# Patient Record
Sex: FEMALE | Race: WHITE | NOT HISPANIC OR LATINO | Employment: OTHER | URBAN - METROPOLITAN AREA
[De-identification: names, ages, dates, MRNs, and addresses within clinical notes are randomized per-mention and may not be internally consistent; named-entity substitution may affect disease eponyms.]

---

## 2017-01-10 ENCOUNTER — TELEPHONE (OUTPATIENT)
Dept: OPHTHALMOLOGY | Facility: CLINIC | Age: 62
End: 2017-01-10

## 2017-01-22 PROBLEM — I24.9 ACS (ACUTE CORONARY SYNDROME): Status: ACTIVE | Noted: 2017-01-22

## 2017-01-24 PROBLEM — I24.9 ACS (ACUTE CORONARY SYNDROME): Status: RESOLVED | Noted: 2017-01-22 | Resolved: 2017-01-24

## 2017-01-24 PROBLEM — I24.9 ACS (ACUTE CORONARY SYNDROME): Status: ACTIVE | Noted: 2017-01-24

## 2017-01-31 ENCOUNTER — HOSPITAL ENCOUNTER (OUTPATIENT)
Dept: RADIOLOGY | Facility: HOSPITAL | Age: 62
Discharge: HOME | End: 2017-01-31
Attending: PODIATRIST
Payer: MEDICARE

## 2017-01-31 ENCOUNTER — OFFICE VISIT (OUTPATIENT)
Dept: PODIATRY | Facility: CLINIC | Age: 62
End: 2017-01-31
Payer: MEDICARE

## 2017-01-31 VITALS — HEIGHT: 65 IN | WEIGHT: 190.5 LBS | BODY MASS INDEX: 31.74 KG/M2

## 2017-01-31 DIAGNOSIS — M21.6X9 ACQUIRED EQUINUS DEFORMITY OF FOOT, UNSPECIFIED LATERALITY: ICD-10-CM

## 2017-01-31 DIAGNOSIS — M79.673 ARCH PAIN, UNSPECIFIED LATERALITY: Primary | ICD-10-CM

## 2017-01-31 DIAGNOSIS — M79.673 ARCH PAIN, UNSPECIFIED LATERALITY: ICD-10-CM

## 2017-01-31 PROCEDURE — 99999 PR PBB SHADOW E&M-EST. PATIENT-LVL II: CPT | Mod: PBBFAC,,, | Performed by: PODIATRIST

## 2017-01-31 PROCEDURE — 73630 X-RAY EXAM OF FOOT: CPT | Mod: 26,50,, | Performed by: RADIOLOGY

## 2017-01-31 PROCEDURE — 73630 X-RAY EXAM OF FOOT: CPT | Mod: 50,TC,PO

## 2017-01-31 PROCEDURE — 99213 OFFICE O/P EST LOW 20 MIN: CPT | Mod: 25,S$GLB,, | Performed by: PODIATRIST

## 2017-01-31 PROCEDURE — 29540 STRAPPING ANKLE &/FOOT: CPT | Mod: 50,S$GLB,, | Performed by: PODIATRIST

## 2017-01-31 PROCEDURE — 1159F MED LIST DOCD IN RCRD: CPT | Mod: S$GLB,,, | Performed by: PODIATRIST

## 2017-01-31 RX ORDER — VENLAFAXINE HYDROCHLORIDE 150 MG/1
150 CAPSULE, EXTENDED RELEASE ORAL
COMMUNITY
End: 2017-01-31

## 2017-01-31 RX ORDER — LAMOTRIGINE 200 MG/1
200 TABLET ORAL
COMMUNITY
End: 2017-01-31

## 2017-01-31 RX ORDER — ARIPIPRAZOLE 10 MG/1
10 TABLET ORAL
COMMUNITY
End: 2017-05-18

## 2017-01-31 RX ORDER — MELOXICAM 15 MG/1
15 TABLET ORAL DAILY
Qty: 30 TABLET | Refills: 0 | Status: SHIPPED | OUTPATIENT
Start: 2017-01-31 | End: 2018-09-26

## 2017-01-31 NOTE — PROGRESS NOTES
Subjective:      Patient ID: Maral Archibald is a 61 y.o. female.    Chief Complaint: Foot Pain (bilateral)    Deep aching pain both arches.  Gradual onset, worsening over past several weeks, aggravated by increased weight bearing, shoe gear, pressure.  No previous medical treatment.  OTC pain med not helping.  Denies truama, surgeyr both feet.      Review of Systems   Constitution: Negative for chills, diaphoresis, fever, malaise/fatigue and night sweats.   Cardiovascular: Negative for claudication, cyanosis, leg swelling and syncope.   Skin: Negative for color change, dry skin, rash, suspicious lesions and unusual hair distribution.   Musculoskeletal: Positive for myalgias. Negative for falls, joint pain, joint swelling, muscle cramps, muscle weakness and stiffness.   Gastrointestinal: Negative for constipation, diarrhea, nausea and vomiting.   Neurological: Negative for brief paralysis, disturbances in coordination, focal weakness, numbness, paresthesias, sensory change and tremors.           Objective:      Physical Exam   Constitutional: She appears well-developed and well-nourished. She is cooperative. No distress.   Cardiovascular:   Pulses:       Popliteal pulses are 2+ on the right side, and 2+ on the left side.        Dorsalis pedis pulses are 2+ on the right side, and 2+ on the left side.        Posterior tibial pulses are 2+ on the right side, and 2+ on the left side.   Capillary refill 3 seconds all toes/distal feet, all toes/both feet warm to touch.      Negative lymphadenopathy bilateral popliteal fossa and tarsal tunnel.      Negavie lower extremity edema bilateral.     Musculoskeletal:        Right ankle: Normal. She exhibits normal range of motion, no swelling, no ecchymosis, no deformity, no laceration and normal pulse. Achilles tendon normal. Achilles tendon exhibits no pain, no defect and normal Tamayo's test results.   Sharp deep pain to palpation inferior arches bilateral at medial arch  without ecchymosis, erythema, edema, or cardinal signs infection, and no signs of trauma.    Ankle dorsiflexion decreased at <10degrees bilateral with moderate increase with knee flexion bilateral.    Otherwise, Normal angle, base, station of gait. All ten toes without clubbing, cyanosis, or signs of ischemia.  No pain to palpation bilateral lower extremities.  Range of motion, stability, muscle strength, and muscle tone normal bilateral feet and legs.         Lymphadenopathy: No inguinal adenopathy noted on the right or left side.   Negative lymphadenopathy bilateral popliteal fossa and tarsal tunnel.   Neurological: She is alert. She has normal strength. She displays no atrophy and no tremor. No sensory deficit. She exhibits normal muscle tone. She displays no seizure activity. Gait normal.   Reflex Scores:       Patellar reflexes are 2+ on the right side and 2+ on the left side.       Achilles reflexes are 2+ on the right side and 2+ on the left side.  Negative tinel sign to percussion sural, superficial peroneal, deep peroneal, saphenous, and posterior tibial nerves right and left ankles and feet.     Skin: Skin is warm, dry and intact. No abrasion, no bruising, no burn, no ecchymosis, no laceration, no lesion and no rash noted. She is not diaphoretic. No cyanosis or erythema. No pallor. Nails show no clubbing.     Skin is normal age and health appropriate color, turgor, texture, and temperature bilateral lower extremities without ulceration, hyperpigmentation, discoloration, masses nodules or cords palpated.  No ecchymosis, erythema, edema, or cardinal signs of infection bilateral lower extremities.               Assessment:       Encounter Diagnoses   Name Primary?    Arch pain, unspecified laterality Yes    Acquired equinus deformity of foot, unspecified laterality          Plan:       Maral was seen today for foot pain.    Diagnoses and all orders for this visit:    Arch pain, unspecified laterality  -      X-Ray Foot Complete Bilateral; Future    Acquired equinus deformity of foot, unspecified laterality  -     X-Ray Foot Complete Bilateral; Future    Other orders  -     meloxicam (MOBIC) 15 MG tablet; Take 1 tablet (15 mg total) by mouth once daily.      I counseled the patient on her conditions, their implications and medical management.        Patient will stretch the tendo achilles complex three times daily as demonstrated in the office.  Literature was dispensed illustrating proper stretching technique.    I applied a plantar rest strapping to the patient's foot to offload symptomatic area, support the arch, and relieve pain.    Patient will obtain over the counter arch supports and wear them in shoes whenever possible.  Athletic shoes intended for walking or running are usually best.    The patient was advised that NSAID-type medications have two very important potential side effects: gastrointestinal irritation including hemorrhage and renal injuries. She was asked to take the medication with food and to stop if she experiences any GI upset. I asked her to call for vomiting, abdominal pain or black/bloody stools. The patient expresses understanding of these issues and questions were answered.    Discussed conservative treatment with shoes of adequate dimensions, material, and style to alleviate symptoms and delay or prevent surgical intervention.    Rx x-rays, meloxicam          Return in about 1 month (around 2/28/2017).

## 2017-01-31 NOTE — MR AVS SNAPSHOT
[facility]  [address] [facility] [address] [place] [address]  Phone: [phone]                  Maral Archibald   2017 3:15 PM   Office Visit    Description:  Female : 1955   Provider:  Mikal Mejia DPM   Department:  [facility]           Reason for Visit     Foot Pain           Diagnoses this Visit        Comments    Arch pain, unspecified laterality    -  Primary     Acquired equinus deformity of foot, unspecified laterality                To Do List           Future Appointments        Provider Department Dept Phone    2017 5:30 PM [facility] XR3 [facility] [phone]    3/7/2017 2:45 PM Mikal Mejia DPM [facility] [phone]      Goals (5 Years of Data)              17    Blood Pressure < 140/90   129/63  144/85  131/78      Follow-Up and Disposition     Return in about 1 month (around 2017).    Follow-up and Disposition History       These Medications        Disp Refills Start End    meloxicam (MOBIC) 15 MG tablet 30 tablet 0 2017     Take 1 tablet (15 mg total) by mouth once daily. - Oral    Pharmacy: [facility] - [address] Ph #: [phone]         [facility] On Call     [facility] On Call Nurse Care Line -  Assistance  Registered nurses in the [facility] provide clinical advisement, health education, appointment booking, and other advisory services.  Call for this free service at [phone].             Medications           Message regarding Medications     Verify the changes and/or additions to your medication regime listed below are the same as discussed with your clinician today.  If any of these changes or additions are incorrect, please notify your healthcare provider.        START taking these NEW medications        Refills    meloxicam (MOBIC) 15 MG tablet 0    Sig: Take 1 tablet (15 mg total) by mouth once daily.    Class:  Normal    Route: Oral           Verify that the below list of medications is an accurate representation of the medications you are currently taking.  If none reported, the list may be blank. If incorrect, please contact your healthcare provider. Carry this list with you in case of emergency.           Current Medications     aripiprazole (ABILIFY) 10 MG Tab Take 10 mg by mouth.    clonazePAM (KLONOPIN) 1 MG tablet TAKE 1 TABLET EVERY DAY AS NEEDED    felodipine (PLENDIL) 10 MG 24 hr tablet Take 1 tablet (10 mg total) by mouth once daily.    hydrochlorothiazide (MICROZIDE) 12.5 mg capsule TAKE 1 CAPSULE ONE TIME DAILY    hydrocodone-acetaminophen 10-325mg (NORCO)  mg Tab Take 1 tablet by mouth as needed.     hydrocodone-acetaminophen 7.5-325mg (NORCO) 7.5-325 mg per tablet Take 1 tablet by mouth every 6 (six) hours as needed.    lamotrigine (LAMICTAL) 200 MG tablet Take 1 tablet (200 mg total) by mouth once daily.    latanoprost 0.005 % ophthalmic solution INSTILL 1 DROP INTO BOTH EYES EVERY EVENING    melatonin 5 mg Cap Take 5 mg by mouth every evening.     multivitamin (ONE DAILY MULTIVITAMIN) per tablet Take 1 tablet by mouth once daily.    omeprazole (PRILOSEC) 20 MG capsule Take 1 capsule (20 mg total) by mouth once daily.    polyethylene glycol (GLYCOLAX) 17 gram/dose powder Take 17 g by mouth daily as needed.    promethazine (PHENERGAN) 25 MG tablet TAKE 1 TABLET (25 MG TOTAL) BY MOUTH EVERY 6 (SIX) HOURS AS NEEDED FOR NAUSEA.    RESTASIS 0.05 % ophthalmic emulsion INSTILL 1 DROP INTO BOTH EYES TWICE DAILY    sumatriptan (IMITREX) 100 MG tablet TAKE 1 TABLET EVERY DAY IF NEEDED FOR MIGRAINE    venlafaxine (EFFEXOR-XR) 150 MG Cp24 Take 1 capsule (150 mg total) by mouth every morning.    meloxicam (MOBIC) 15 MG tablet Take 1 tablet (15 mg total) by mouth once daily.           Clinical Reference Information           Vital Signs - Last Recorded  Most recent update: 1/31/2017  4:05 PM by Velma Priest,  "LPN    Ht Wt LMP BMI       5' 5" (1.651 m) 86.4 kg (190 lb 7.6 oz) 12/28/1986 31.7 kg/m2       Allergies as of 1/31/2017     Sulfa (Sulfonamide Antibiotics)      Immunizations Administered on Date of Encounter - 1/31/2017     None      Orders Placed During Today's Visit     Future Labs/Procedures Expected by Expires    X-Ray Foot Complete Bilateral  1/31/2017 2/1/2018      "

## 2017-02-08 ENCOUNTER — TELEPHONE (OUTPATIENT)
Dept: NEUROSURGERY | Facility: CLINIC | Age: 62
End: 2017-02-08

## 2017-02-08 DIAGNOSIS — R51.9 HEADACHE, UNSPECIFIED HEADACHE TYPE: Primary | ICD-10-CM

## 2017-02-08 DIAGNOSIS — I10 ESSENTIAL HYPERTENSION: ICD-10-CM

## 2017-02-08 NOTE — TELEPHONE ENCOUNTER
----- Message from Antonia Tuttle sent at 2/6/2017  5:32 PM CST -----  Contact: Self   Pt was seen by the physician two years ago at [facility] and was treated for fluid on her brain. Pt stated she is having the same symptoms and would like to be seen.     Pt can be contacted at [phone]

## 2017-02-08 NOTE — TELEPHONE ENCOUNTER
Called patient to inform her that referral to sleep medicine was sent per Jessica Dallas's orders. Instructed patient to call our office to schedule follow up upon completion of sleep study. Patient JAS

## 2017-02-10 ENCOUNTER — TELEPHONE (OUTPATIENT)
Dept: NEUROLOGY | Facility: CLINIC | Age: 62
End: 2017-02-10

## 2017-02-20 ENCOUNTER — TELEPHONE (OUTPATIENT)
Dept: NEUROSURGERY | Facility: CLINIC | Age: 62
End: 2017-02-20

## 2017-02-20 NOTE — TELEPHONE ENCOUNTER
Pt called regarding sleep study referral. Informed pt the referral has been entered. Gave pt the Sleep Study Providers name for her to call to discuss scheduling an appt. Informed Pt after sleep study, she is to call us for a follow up appt. Pt verbalized understanding.

## 2017-03-21 RX ORDER — LATANOPROST 50 UG/ML
SOLUTION/ DROPS OPHTHALMIC
Qty: 3 BOTTLE | Refills: 0 | Status: SHIPPED | OUTPATIENT
Start: 2017-03-21 | End: 2017-05-24 | Stop reason: SDUPTHER

## 2017-04-06 ENCOUNTER — TELEPHONE (OUTPATIENT)
Dept: NEUROLOGY | Facility: CLINIC | Age: 62
End: 2017-04-06

## 2017-04-06 NOTE — TELEPHONE ENCOUNTER
----- Message from Apurva Amor sent at 4/6/2017 12:47 PM CDT -----  Patient is experiencing bad headaches/requesting soon appointment/please call patient back at [phone] to schedule or advise.

## 2017-04-13 ENCOUNTER — OFFICE VISIT (OUTPATIENT)
Dept: NEUROLOGY | Facility: CLINIC | Age: 62
End: 2017-04-13
Payer: MEDICARE

## 2017-04-13 VITALS
HEART RATE: 90 BPM | DIASTOLIC BLOOD PRESSURE: 93 MMHG | SYSTOLIC BLOOD PRESSURE: 149 MMHG | HEIGHT: 64 IN | TEMPERATURE: 97 F | WEIGHT: 180 LBS | BODY MASS INDEX: 30.73 KG/M2 | RESPIRATION RATE: 16 BRPM

## 2017-04-13 DIAGNOSIS — F41.9 ANXIETY: ICD-10-CM

## 2017-04-13 DIAGNOSIS — G43.719 INTRACTABLE CHRONIC MIGRAINE WITHOUT AURA AND WITHOUT STATUS MIGRAINOSUS: ICD-10-CM

## 2017-04-13 DIAGNOSIS — G47.00 INSOMNIA, UNSPECIFIED TYPE: ICD-10-CM

## 2017-04-13 DIAGNOSIS — M50.90 CERVICAL NECK PAIN WITH EVIDENCE OF DISC DISEASE: Primary | ICD-10-CM

## 2017-04-13 DIAGNOSIS — F32.A DEPRESSION, UNSPECIFIED DEPRESSION TYPE: ICD-10-CM

## 2017-04-13 PROCEDURE — 96374 THER/PROPH/DIAG INJ IV PUSH: CPT | Mod: S$GLB,,, | Performed by: PSYCHIATRY & NEUROLOGY

## 2017-04-13 PROCEDURE — 1160F RVW MEDS BY RX/DR IN RCRD: CPT | Mod: S$GLB,,, | Performed by: PSYCHIATRY & NEUROLOGY

## 2017-04-13 PROCEDURE — 3080F DIAST BP >= 90 MM HG: CPT | Mod: S$GLB,,, | Performed by: PSYCHIATRY & NEUROLOGY

## 2017-04-13 PROCEDURE — 99214 OFFICE O/P EST MOD 30 MIN: CPT | Mod: 25,S$GLB,, | Performed by: PSYCHIATRY & NEUROLOGY

## 2017-04-13 PROCEDURE — 3077F SYST BP >= 140 MM HG: CPT | Mod: S$GLB,,, | Performed by: PSYCHIATRY & NEUROLOGY

## 2017-04-13 PROCEDURE — 96372 THER/PROPH/DIAG INJ SC/IM: CPT | Mod: 59,S$GLB,, | Performed by: PSYCHIATRY & NEUROLOGY

## 2017-04-13 PROCEDURE — 99999 PR PBB SHADOW E&M-EST. PATIENT-LVL III: CPT | Mod: PBBFAC,,, | Performed by: PSYCHIATRY & NEUROLOGY

## 2017-04-13 RX ORDER — LISINOPRIL 20 MG/1
20 TABLET ORAL DAILY
Qty: 90 TABLET | Refills: 3 | Status: SHIPPED | OUTPATIENT
Start: 2017-04-13 | End: 2017-05-17 | Stop reason: SDUPTHER

## 2017-04-13 RX ORDER — KETOROLAC TROMETHAMINE 30 MG/ML
30 INJECTION, SOLUTION INTRAMUSCULAR; INTRAVENOUS ONCE
Status: COMPLETED | OUTPATIENT
Start: 2017-04-13 | End: 2017-04-13

## 2017-04-13 RX ORDER — METHYLPREDNISOLONE 4 MG/1
TABLET ORAL
Qty: 1 PACKAGE | Refills: 0 | Status: SHIPPED | OUTPATIENT
Start: 2017-04-13 | End: 2017-05-04

## 2017-04-13 RX ORDER — ONDANSETRON 2 MG/ML
4 INJECTION INTRAMUSCULAR; INTRAVENOUS
Status: COMPLETED | OUTPATIENT
Start: 2017-04-13 | End: 2017-04-13

## 2017-04-13 RX ADMIN — ONDANSETRON 4 MG: 2 INJECTION INTRAMUSCULAR; INTRAVENOUS at 10:04

## 2017-04-13 RX ADMIN — KETOROLAC TROMETHAMINE 30 MG: 30 INJECTION, SOLUTION INTRAMUSCULAR; INTRAVENOUS at 10:04

## 2017-04-13 NOTE — PROGRESS NOTES
"Subjective:       Patient ID: Maral Archibald is a 60 y.o. female.    Chief Complaint: Headache    HPI  The patient is a 60 y/o woman with severe intractable headaches that "feel as they did when diagnosed with hydrocephalus" but is now proven to have stable ventriculomegaly. The patient has remote history of intracranial ruptured aneurysms. She had a shunt on the right ventricle that was switched to the left. Last April 2016 it was ligated as it was over draining.  She has been to the ED twice in the last 2 months. The headache is mainly on the vertex associated with nausea not responsive to phenergan. Recent imaging studies show stable ventriculomegaly. Dr. Patel has seen the patient and performed a peripheral ligation at the cervical level. In addition, the patient suffers with severe neck pain. She had recent RFA with no relief. She has been offered a Spinal Cord Stimulator. She is concerned regarding her 20% copay of a $40,000.00 bill.  Otherwise, the information below is accurate and current     Headache questionnaire   1. When did your Headaches start?   Over a year ago   2. Where are your headaches located?   Exactly on top of head with feelings of extreme pressure   3. Are your headaches preceded or accompanied by other symptoms? yes   If yes, please describe. Nausea, sore throat, tight neck   4. Please gege the word that best describes your headache's intensity:   severe   5. Your headache's characteristics:   Excruciating, Pressure   6. Using a scale of 1 through 10, with 0 = no pain and 10 = the worst pain:   What score is your headache now? 8   What score is your headache at its worst? 10   What score is your headache at its best? 5   7. How long does the headache last?   Every day all day   8. How often does the headache occur?   daily   9. Possible associated headache symptoms:   Sensitivity to light, Sensitivity to noise, Blurred vision,   Loss of appetite, Nausea, Dizziness, Vertigo,   Irritability, " Anger, Anxiety, Nasal or sinus pressure/ pain,   Problems with concentration,   Problems with memory, Problems with task completion,   Problems with relaxation, Neck tightness, Neck pain,   Dark floaters, problems swallowing, facial pain   10. Does the headache awaken you at night? yes   If so, how often? Not every night   11. Headache improving factors:   Darkness, Ice,   Medications: Norco, Toradol, BC powder with Coke or caffeine   12. Headache worsening factors:   Fatigue, Light, Noise, Bending over,   Changes in weather, Stress                  Review of Systems   Constitutional: Positive for diaphoresis (When working outside) and fatigue. Negative for fever, activity change and appetite change.   HENT: Positive for sinus pressure and sore throat (Dry mouth). Negative for congestion, dental problem, hearing loss, tinnitus, trouble swallowing and voice change.    Eyes: Positive for photophobia, pain (Headache behind the eyes) and visual disturbance (Floaters). Negative for redness.   Respiratory: Positive for cough and wheezing. Negative for chest tightness and shortness of breath.    Cardiovascular: Negative for chest pain, palpitations and leg swelling.   Gastrointestinal: Positive for nausea and constipation. Negative for vomiting, abdominal pain and blood in stool.   Endocrine: Negative for cold intolerance and heat intolerance.   Genitourinary: Negative for urgency, frequency, difficulty urinating and menstrual problem.   Musculoskeletal: Positive for joint swelling (Lately, mild at the ankles), neck pain and neck stiffness. Negative for myalgias, back pain, arthralgias and gait problem.   Skin: Negative.    Neurological: Positive for dizziness, light-headedness and headaches. Negative for tremors, seizures, syncope, facial asymmetry, speech difficulty, weakness and numbness.   Hematological: Negative for adenopathy. Does not bruise/bleed easily.   Psychiatric/Behavioral: Positive for confusion, sleep  disturbance, dysphoric mood (Depression and anxiety), decreased concentration and agitation. Negative for suicidal ideas, behavioral problems and self-injury. The patient is not nervous/anxious and is not hyperactive.          Past Medical History   Diagnosis Date    Hypertension     Hepatitis C     Allergy     Headache(784.0)     Seizures     Bipolar disorder      type I    Closed head injury     Stroke      aneurysm, cerebral     Past Surgical History   Procedure Laterality Date     section, classic      Tonsillectomy, adenoidectomy, bilateral myringotomy and tubes      Cholecystectomy      Cerebral aneurysm repair      Total abdominal hysterectomy      Tonsillectomy      Adenoidectomy      Lasik       OU x 10 yrs    Shunt removal Left      History     Social History    Marital Status:      Spouse Name: N/A     Number of Children: 0    Years of Education: N/A     Occupational History    Not on file.     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  Maral is able to drive.  She does not work outside of her home.      Allergies   Allergen Reactions    Sulfa (Sulfonamide Antibiotics) Nausea Only         Current Outpatient Prescriptions   Medication Sig    acetaminophen (TYLENOL) 500 mg Cap     clonazePAM (KLONOPIN) 1 MG tablet TAKE 1 TABLET (1 MG TOTAL) BY MOUTH DAILY AS NEEDED.    COTTON SWABS MISC     cycloSPORINE (RESTASIS) 0.05 % ophthalmic emulsion Place 0.05 mLs (1 drop total) into both eyes 2 (two) times daily.    esterified estrogens (MENEST) 0.625 MG tablet Take 1 tablet (0.625 mg total) by mouth once daily.    felodipine (PLENDIL) 10 MG 24 hr tablet Take 1 tablet (10 mg  total) by mouth once daily.    fexofenadine (ALLEGRA) 180 MG tablet Take 1 tablet (180 mg total) by mouth once daily.    fluticasone (FLONASE) 50 mcg/actuation nasal spray 1 SPRAY BY EACH NARE ROUTE ONCE DAILY.    fluticasone (FLONASE) 50 mcg/actuation nasal spray 1 SPRAY BY EACH NARE ROUTE ONCE DAILY.    hydrochlorothiazide (MICROZIDE) 12.5 mg capsule Take 1 capsule (12.5 mg total) by mouth once daily.    hydrocodone-acetaminophen 10-325mg (NORCO)  mg Tab Take 1 tablet by mouth 3 (three) times daily as needed. (Patient taking differently: Take 1 tablet by mouth 3 (three) times daily as needed. 1/2 tab with 2 500 mg tylenol)    lamotrigine (LAMICTAL) 200 MG tablet Take 1 tablet (200 mg total) by mouth once daily.    latanoprost 0.005 % ophthalmic solution INSTILL 1 DROP INTO BOTH EYES EVERY EVENING    melatonin 5 mg Cap every evening.     omeprazole (PRILOSEC) 20 MG capsule Take 1 capsule (20 mg total) by mouth once daily.    pediatric multivit-iron-min Chew     polyethylene glycol (GLYCOLAX) 17 gram/dose powder Take 17 g by mouth daily as needed.    promethazine (PHENERGAN) 25 MG tablet Take 1 tablet (25 mg total) by mouth every 6 (six) hours as needed for Nausea.    sumatriptan (IMITREX) 100 MG tablet Take 1 tablet (100 mg total) by mouth once.    venlafaxine (EFFEXOR-XR) 150 MG Cp24 Take 1 capsule (150 mg total) by mouth every morning.     No current facility-administered medications for this visit.     Objective:      Vitals:    04/13/17 0949   BP: (!) 149/93   Pulse: 90   Resp: 16   Temp: 97 °F (36.1 °C)     Body mass index is 30.9 kg/(m^2).    Physical Exam  Alert and oriented in distress  Constitutional:     HENT:    Head: Evidence of old well healed craniotomy over rigth frontoparietal regiontraumatic, oral and nasal mucosa intact  Eyes: Conjunctivae and EOM are normal. Pupils are equal, round, and reactive to light OU  Neck: Neck supple. No thyromegaly present  Cardiovascular: Normal  rate and normal heart sounds  No murmur heard  Pulmonary/Chest: Effort normal and breath sounds normal  Musculoskeletal:Decreased cervical range of motion. Significant cervical paraspinal muscle spasm.  Skin: Skin is warm and dry  Psychiatric: Dysphoric mood and flat affect     Neuro exam:    Mental status:  Awake, attentive, Alert, oriented to self, place, year and month  Language function is intact  Naming, repetition and spontaneous meaningful speech expression are intact  Speech fluency is good and speech is clear  Remote and recent memory are good  Patient able to count backwards by 7    No findings to suggest executive dysfuntion    Patient has adequate insight    Mood is dysphoric    Cranial Nerves:  Smell was not formally evaluated  Cranial Nerves II - XII: intact  Pursuits were smooth, normal saccades, no nystagmus OU  Funduscopic exam - disc were flat and pink, no exudates or hemorrhages OU  Motor - facial movement was symmetrical and normal     Palate moved well and was symmetrical with normal palatal and oral sensation  Tongue movements were full    Coordination:     Rapid alternating movements and rapid finger tapping - normal bilaterally  Finger to nose - normal and symmetric bilaterally   Heel to shin test - normal and symmetric bilaterally   Arm roll - smooth and symmetric   No intentional or positional tremor.     Motor:  Normal muscle bulk and symmetry. No fasciculations were noted    No pronator drift  Strength of shoulder abduction, elbow flexion, wrist extension, elbow extension, finger flexion and hand intrinsics were 5/5 bilaterally    Strength of hip flexion, knee extension, knee flexion, ankle dorsiflexion, ankle plantarflexion were 5/5 bilaterally     Reflexes:  Tendon reflexes were 2 + at biceps, triceps, brachioradialis, patellar, and Achilles bilaterally  On plantar stimulation toes were down going bilaterally  No clonus was noted     Sensory: Intact to light touch, pin prick in all  extremities. Vibration and proprioception intact in all extremities.     Gait: Romberg absent. Normal gait. Normal arm swing and turns. Normal postural reflexes.     Review of Data:  Brain MRI: Enlargement of the lateral ventricles despite the presence of a ventriculoperitoneal shunt catheter.  No interval change when compared with the prior CT. Remote areas of cerebral infarction as detailed above. Chronic microvascular ischemic changes within the periventricular white matter.    Assessment and Plan   Multiple aneurysms status post rupture and complicating hydrocephalus ligated in April 2015. MRI reveals ventricular dilation which is now proven to be stable.   Chronic Migraine, Intractable, will request Botox authorization  Toradol 30 mg IV today and Zofran 4 mg IV today  Hypertension noted. Will add Lisinopril 20 mg daily  Medrol dose pack for status migrainosus  Referral to Dr. Molina for second opinion regarding SCS for intractable neck pain  Patient offered to come to the clinic during the week for IV Zofran/Toradol instead of going to the ED  Insomnia  Depression  Anxiety    Michelle Heaton M.D  Medical Director, Headache and Facial Pain  [facility]

## 2017-04-25 ENCOUNTER — PATIENT MESSAGE (OUTPATIENT)
Dept: NEUROLOGY | Facility: CLINIC | Age: 62
End: 2017-04-25

## 2017-04-25 ENCOUNTER — TELEPHONE (OUTPATIENT)
Dept: NEUROLOGY | Facility: CLINIC | Age: 62
End: 2017-04-25

## 2017-04-25 NOTE — TELEPHONE ENCOUNTER
Returned patient call. No answer, no option to leave a voicemail. Send a message via patient portal.

## 2017-04-25 NOTE — TELEPHONE ENCOUNTER
----- Message from Delmy Conner sent at 4/25/2017 12:46 PM CDT -----  Contact: self  Patient would like to speak to nurse regarding medication  MethyIPEDNI 4 mg It made her very sick   He phone is broke but she can received at text on her line to advise.     Thanks

## 2017-05-16 ENCOUNTER — TELEPHONE (OUTPATIENT)
Dept: NEUROLOGY | Facility: CLINIC | Age: 62
End: 2017-05-16

## 2017-05-16 NOTE — TELEPHONE ENCOUNTER
----- Message from Venice Schwarz sent at 5/16/2017 10:09 AM CDT -----  Contact: Patient  Maral, patient [phone], Calling for same day appointment. Patient is in extreme pain. 20 twenty minutes from office. Please advise. Booked appointment 5/17/17 at 1 pm and placed on the wait list. Thanks.

## 2017-05-17 ENCOUNTER — OFFICE VISIT (OUTPATIENT)
Dept: NEUROLOGY | Facility: CLINIC | Age: 62
End: 2017-05-17
Payer: MEDICARE

## 2017-05-17 VITALS
HEART RATE: 81 BPM | TEMPERATURE: 98 F | HEIGHT: 64 IN | DIASTOLIC BLOOD PRESSURE: 95 MMHG | RESPIRATION RATE: 17 BRPM | SYSTOLIC BLOOD PRESSURE: 154 MMHG | BODY MASS INDEX: 30.48 KG/M2 | WEIGHT: 178.56 LBS

## 2017-05-17 DIAGNOSIS — G47.00 INSOMNIA, UNSPECIFIED TYPE: ICD-10-CM

## 2017-05-17 DIAGNOSIS — F41.9 ANXIETY: ICD-10-CM

## 2017-05-17 DIAGNOSIS — G91.9 HYDROCEPHALUS: ICD-10-CM

## 2017-05-17 DIAGNOSIS — G43.711 CHRONIC MIGRAINE WITHOUT AURA, WITH INTRACTABLE MIGRAINE, SO STATED, WITH STATUS MIGRAINOSUS: ICD-10-CM

## 2017-05-17 DIAGNOSIS — F32.A DEPRESSION, UNSPECIFIED DEPRESSION TYPE: ICD-10-CM

## 2017-05-17 DIAGNOSIS — I60.7 CEREBRAL ANEURYSM RUPTURE: Primary | ICD-10-CM

## 2017-05-17 PROCEDURE — 1160F RVW MEDS BY RX/DR IN RCRD: CPT | Mod: S$GLB,,, | Performed by: PSYCHIATRY & NEUROLOGY

## 2017-05-17 PROCEDURE — 64615 CHEMODENERV MUSC MIGRAINE: CPT | Mod: S$GLB,,, | Performed by: PSYCHIATRY & NEUROLOGY

## 2017-05-17 PROCEDURE — 96374 THER/PROPH/DIAG INJ IV PUSH: CPT | Mod: 59,S$GLB,, | Performed by: PSYCHIATRY & NEUROLOGY

## 2017-05-17 PROCEDURE — 3077F SYST BP >= 140 MM HG: CPT | Mod: S$GLB,,, | Performed by: PSYCHIATRY & NEUROLOGY

## 2017-05-17 PROCEDURE — 99999 PR PBB SHADOW E&M-EST. PATIENT-LVL III: CPT | Mod: PBBFAC,,, | Performed by: PSYCHIATRY & NEUROLOGY

## 2017-05-17 PROCEDURE — 3080F DIAST BP >= 90 MM HG: CPT | Mod: S$GLB,,, | Performed by: PSYCHIATRY & NEUROLOGY

## 2017-05-17 RX ORDER — LISINOPRIL 20 MG/1
40 TABLET ORAL DAILY
Qty: 180 TABLET | Refills: 3 | Status: SHIPPED | OUTPATIENT
Start: 2017-05-17 | End: 2018-05-04 | Stop reason: SDUPTHER

## 2017-05-17 NOTE — MR AVS SNAPSHOT
[facility]  [address] [place] [address]  Phone: [phone]  Fax: [phone]                  Maral Archibald   2017 1:00 PM   Office Visit    Description:  Female : 1955   Provider:  Shaniqua Heaton MD   Department:  [place] [facility]           Diagnoses this Visit        Comments    Cerebral aneurysm rupture    -  Primary     Chronic migraine without aura, with intractable migraine, so stated, with status migrainosus         Anxiety         Hydrocephalus         Insomnia, unspecified type         Depression, unspecified depression type                To Do List           Future Appointments        Provider Department Dept Phone    2017 9:45 AM Jono Molina MD [place] [facility] [phone]    2017 10:00 AM Shaniqua Heaton MD [facility] [phone]    2017 2:15 PM Shaniqua Heaton MD [facility] [phone]      Goals (5 Years of Data)              Today    4/13/17    3/20/17    Blood Pressure < 140/90   154/95  154/95  154/95       These Medications        Disp Refills Start End    lisinopril (PRINIVIL,ZESTRIL) 20 MG tablet 180 tablet 3 2017    Take 2 tablets (40 mg total) by mouth once daily. - Oral    Pharmacy: [facility] - [address] Ph #: [phone]         [facility] On Call     [facility] On Call Nurse Care Line -  Assistance  Unless otherwise directed by your provider, please contact [facility], our nurse care line that is available for  assistance.     Registered nurses in the [facility] provide: appointment scheduling, clinical advisement, health education, and other advisory services.  Call: [phone] (toll free)               Medications           Message regarding Medications     Verify the changes and/or additions to your medication regime listed below are the same as discussed with your  clinician today.  If any of these changes or additions are incorrect, please notify your healthcare provider.        These medications were administered today        Dose Freq    onabotulinumtoxina injection 200 Units 200 Units Once    Sig: Inject 200 Units into the muscle once.    Class: Normal    Route: Intramuscular      CHANGE how you are taking these medications     Start Taking Instead of    lisinopril (PRINIVIL,ZESTRIL) 20 MG tablet lisinopril (PRINIVIL,ZESTRIL) 20 MG tablet    Dosage:  Take 2 tablets (40 mg total) by mouth once daily. Dosage:  Take 1 tablet (20 mg total) by mouth once daily.    Reason for Change:  Reorder            Verify that the below list of medications is an accurate representation of the medications you are currently taking.  If none reported, the list may be blank. If incorrect, please contact your healthcare provider. Carry this list with you in case of emergency.           Current Medications     aripiprazole (ABILIFY) 10 MG Tab Take 10 mg by mouth.    clonazePAM (KLONOPIN) 1 MG tablet TAKE 1 TABLET EVERY DAY AS NEEDED    felodipine (PLENDIL) 10 MG 24 hr tablet Take 1 tablet (10 mg total) by mouth once daily.    hydrochlorothiazide (MICROZIDE) 12.5 mg capsule TAKE 1 CAPSULE ONE TIME DAILY    lamotrigine (LAMICTAL) 200 MG tablet Take 1 tablet (200 mg total) by mouth once daily.    latanoprost 0.005 % ophthalmic solution INSTILL 1 DROP INTO BOTH EYES EVERY EVENING    lisinopril (PRINIVIL,ZESTRIL) 20 MG tablet Take 2 tablets (40 mg total) by mouth once daily.    melatonin 5 mg Cap Take 5 mg by mouth every evening.     meloxicam (MOBIC) 15 MG tablet Take 1 tablet (15 mg total) by mouth once daily.    multivitamin (ONE DAILY MULTIVITAMIN) per tablet Take 1 tablet by mouth once daily.    polyethylene glycol (GLYCOLAX) 17 gram/dose powder Take 17 g by mouth daily as needed.    promethazine (PHENERGAN) 25 MG tablet TAKE 1 TABLET (25 MG TOTAL) BY MOUTH EVERY 6 (SIX) HOURS AS NEEDED FOR  "NAUSEA.    RESTASIS 0.05 % ophthalmic emulsion INSTILL 1 DROP INTO BOTH EYES TWICE DAILY    sumatriptan (IMITREX) 100 MG tablet TAKE 1 TABLET EVERY DAY IF NEEDED FOR MIGRAINE    venlafaxine (EFFEXOR-XR) 150 MG Cp24 Take 1 capsule (150 mg total) by mouth every morning.           Clinical Reference Information           Your Vitals Were     BP Pulse Temp Resp Height Weight    154/95 (BP Location: Left arm, Patient Position: Sitting, BP Method: Automatic) 81 98 °F (36.7 °C) (Oral) 17 5' 4" (1.626 m) 81 kg (178 lb 9.2 oz)    Last Period BMI             12/28/1986 30.65 kg/m2         Blood Pressure          Most Recent Value    BP  (!)  154/95      Allergies as of 5/17/2017     Sulfa (Sulfonamide Antibiotics)      Immunizations Administered on Date of Encounter - 5/17/2017     None      Orders Placed During Today's Visit     Future Labs/Procedures Expected by Expires    Creatinine, serum  5/17/2017 7/16/2018    MRI Brain W WO Contrast  5/17/2017 5/17/2018      Administrations This Visit     onabotulinumtoxina injection 200 Units     Admin Date Action Dose Route Administered By             05/17/2017 Given 200 Units Intramuscular Shaniqua Heaton MD                      Language Assistance Services     ATTENTION: Language assistance services are available, free of charge. Please call [phone].      ATENCIÓN: Si gwen kathrine, tiene a nolen disposición servicios gratuitos de asistencia lingüística. Llame al [phone].     CHÚ Ý: N?u b?n nói Ti?ng Vi?t, có các d?ch v? h? tr? ngôn ng? mi?n phí dành cho b?n. G?i s? [phone].         [facility] complies with applicable Federal civil rights laws and does not discriminate on the basis of race, color, national origin, age, disability, or sex.        "

## 2017-05-17 NOTE — PROGRESS NOTES
"Subjective:       Patient ID: Maral Archibald is a 60 y.o. female.    Chief Complaint: Headache  INTERVAL HISTORY  The patient is added to the clinic in extreme pain. She states her head is about to explode. Last visit I added lisinopril 20 mg, her blood pressure remains elevated. She reports significant nausea, constant, she is using phenergan suppositories. I requested Botox but somehow the process was not initiated. We have exhausted all other interventions. I will inject her with Toradol 30 mg and Zofran 4 mg IV to break this headache.  She inquires about Marinol, I explained that at this time, we have to try Botox and reserve Marinol as a last resort  HPI  The patient is a 62 y/o woman with severe intractable headaches that "feel as they did when diagnosed with hydrocephalus" but is now proven to have stable ventriculomegaly. The patient has remote history of intracranial ruptured aneurysms. She had a shunt on the right ventricle that was switched to the left. Last April 2016 it was ligated as it was over draining.  She has been to the ED twice in the last 2 months. The headache is mainly on the vertex associated with nausea not responsive to phenergan. Recent imaging studies show stable ventriculomegaly. Dr. Patel has seen the patient and performed a peripheral ligation at the cervical level. In addition, the patient suffers with severe neck pain. She had recent RFA with no relief. She has been offered a Spinal Cord Stimulator. She is concerned regarding her 20% copay of a $40,000.00 bill.  Otherwise, the information below is accurate and current     Headache questionnaire   1. When did your Headaches start?   Over a year ago   2. Where are your headaches located?   Exactly on top of head with feelings of extreme pressure   3. Are your headaches preceded or accompanied by other symptoms? yes   If yes, please describe. Nausea, sore throat, tight neck   4. Please gege the word that best describes your headache's " intensity:   severe   5. Your headache's characteristics:   Excruciating, Pressure   6. Using a scale of 1 through 10, with 0 = no pain and 10 = the worst pain:   What score is your headache now? 8   What score is your headache at its worst? 10   What score is your headache at its best? 5   7. How long does the headache last?   Every day all day   8. How often does the headache occur?   daily   9. Possible associated headache symptoms:   Sensitivity to light, Sensitivity to noise, Blurred vision,   Loss of appetite, Nausea, Dizziness, Vertigo,   Irritability, Anger, Anxiety, Nasal or sinus pressure/ pain,   Problems with concentration,   Problems with memory, Problems with task completion,   Problems with relaxation, Neck tightness, Neck pain,   Dark floaters, problems swallowing, facial pain   10. Does the headache awaken you at night? yes   If so, how often? Not every night   11. Headache improving factors:   Darkness, Ice,   Medications: Norco, Toradol, BC powder with Coke or caffeine   12. Headache worsening factors:   Fatigue, Light, Noise, Bending over,   Changes in weather, Stress                  Review of Systems   Constitutional: Positive for diaphoresis (When working outside) and fatigue. Negative for fever, activity change and appetite change.   HENT: Positive for sinus pressure and sore throat (Dry mouth). Negative for congestion, dental problem, hearing loss, tinnitus, trouble swallowing and voice change.    Eyes: Positive for photophobia, pain (Headache behind the eyes) and visual disturbance (Floaters). Negative for redness.   Respiratory: Positive for cough and wheezing. Negative for chest tightness and shortness of breath.    Cardiovascular: Negative for chest pain, palpitations and leg swelling.   Gastrointestinal: Positive for nausea and constipation. Negative for vomiting, abdominal pain and blood in stool.   Endocrine: Negative for cold intolerance and heat intolerance.   Genitourinary:  Negative for urgency, frequency, difficulty urinating and menstrual problem.   Musculoskeletal: Positive for joint swelling (Lately, mild at the ankles), neck pain and neck stiffness. Negative for myalgias, back pain, arthralgias and gait problem.   Skin: Negative.    Neurological: Positive for dizziness, light-headedness and headaches. Negative for tremors, seizures, syncope, facial asymmetry, speech difficulty, weakness and numbness.   Hematological: Negative for adenopathy. Does not bruise/bleed easily.   Psychiatric/Behavioral: Positive for confusion, sleep disturbance, dysphoric mood (Depression and anxiety), decreased concentration and agitation. Negative for suicidal ideas, behavioral problems and self-injury. The patient is not nervous/anxious and is not hyperactive.          Past Medical History   Diagnosis Date    Hypertension     Hepatitis C     Allergy     Headache(784.0)     Seizures     Bipolar disorder      type I    Closed head injury     Stroke      aneurysm, cerebral     Past Surgical History   Procedure Laterality Date     section, classic      Tonsillectomy, adenoidectomy, bilateral myringotomy and tubes      Cholecystectomy      Cerebral aneurysm repair      Total abdominal hysterectomy      Tonsillectomy      Adenoidectomy      Lasik       OU x 10 yrs    Shunt removal Left      History     Social History    Marital Status:      Spouse Name: N/A     Number of Children: 0    Years of Education: N/A     Occupational History    Not on file.     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  Maral is able to drive.  She does not work outside of her home.      Allergies    Allergen Reactions    Sulfa (Sulfonamide Antibiotics) Nausea Only         Current Outpatient Prescriptions   Medication Sig    aripiprazole (ABILIFY) 10 MG Tab Take 10 mg by mouth.    clonazePAM (KLONOPIN) 1 MG tablet TAKE 1 TABLET EVERY DAY AS NEEDED    felodipine (PLENDIL) 10 MG 24 hr tablet Take 1 tablet (10 mg total) by mouth once daily.    hydrochlorothiazide (MICROZIDE) 12.5 mg capsule TAKE 1 CAPSULE ONE TIME DAILY    lamotrigine (LAMICTAL) 200 MG tablet Take 1 tablet (200 mg total) by mouth once daily.    latanoprost 0.005 % ophthalmic solution INSTILL 1 DROP INTO BOTH EYES EVERY EVENING    lisinopril (PRINIVIL,ZESTRIL) 20 MG tablet Take 1 tablet (20 mg total) by mouth once daily.    melatonin 5 mg Cap Take 5 mg by mouth every evening.     meloxicam (MOBIC) 15 MG tablet Take 1 tablet (15 mg total) by mouth once daily.    multivitamin (ONE DAILY MULTIVITAMIN) per tablet Take 1 tablet by mouth once daily.    polyethylene glycol (GLYCOLAX) 17 gram/dose powder Take 17 g by mouth daily as needed.    promethazine (PHENERGAN) 25 MG tablet TAKE 1 TABLET (25 MG TOTAL) BY MOUTH EVERY 6 (SIX) HOURS AS NEEDED FOR NAUSEA.    RESTASIS 0.05 % ophthalmic emulsion INSTILL 1 DROP INTO BOTH EYES TWICE DAILY    sumatriptan (IMITREX) 100 MG tablet TAKE 1 TABLET EVERY DAY IF NEEDED FOR MIGRAINE    venlafaxine (EFFEXOR-XR) 150 MG Cp24 Take 1 capsule (150 mg total) by mouth every morning.     No current facility-administered medications for this visit.        No current facility-administered medications for this visit.     Objective:      Vitals:    05/17/17 1305   BP: (!) 154/95   Pulse: 81   Resp: 17   Temp: 98 °F (36.7 °C)     Body mass index is 30.65 kg/(m^2).  .    Physical Exam  Alert and oriented in distress  Constitutional:     HENT:    Head: Evidence of old well healed craniotomy over rigth frontoparietal regiontraumatic, oral and nasal mucosa intact  Eyes: Conjunctivae and EOM are normal. Pupils are  equal, round, and reactive to light OU  Neck: Neck supple. No thyromegaly present  Cardiovascular: Normal rate and normal heart sounds  No murmur heard  Pulmonary/Chest: Effort normal and breath sounds normal  Musculoskeletal:Decreased cervical range of motion. Significant cervical paraspinal muscle spasm.  Skin: Skin is warm and dry  Psychiatric: Dysphoric mood and flat affect     Neuro exam:    Mental status:  Awake, attentive, Alert, oriented to self, place, year and month  Language function is intact  Naming, repetition and spontaneous meaningful speech expression are intact  Speech fluency is good and speech is clear  Remote and recent memory are good  Patient able to count backwards by 7    No findings to suggest executive dysfuntion    Patient has adequate insight    Mood is dysphoric    Cranial Nerves:  Smell was not formally evaluated  Cranial Nerves II - XII: intact  Pursuits were smooth, normal saccades, no nystagmus OU  Funduscopic exam - disc were flat and pink, no exudates or hemorrhages OU  Motor - facial movement was symmetrical and normal     Palate moved well and was symmetrical with normal palatal and oral sensation  Tongue movements were full    Coordination:     Rapid alternating movements and rapid finger tapping - normal bilaterally  Finger to nose - normal and symmetric bilaterally   Heel to shin test - normal and symmetric bilaterally   Arm roll - smooth and symmetric   No intentional or positional tremor.     Motor:  Normal muscle bulk and symmetry. No fasciculations were noted    No pronator drift  Strength of shoulder abduction, elbow flexion, wrist extension, elbow extension, finger flexion and hand intrinsics were 5/5 bilaterally    Strength of hip flexion, knee extension, knee flexion, ankle dorsiflexion, ankle plantarflexion were 5/5 bilaterally     Reflexes:  Tendon reflexes were 2 + at biceps, triceps, brachioradialis, patellar, and Achilles bilaterally  On plantar stimulation toes  were down going bilaterally  No clonus was noted     Sensory: Intact to light touch, pin prick in all extremities. Vibration and proprioception intact in all extremities.     Gait: Romberg absent. Normal gait. Normal arm swing and turns. Normal postural reflexes.     Review of Data:  Brain MRI: Enlargement of the lateral ventricles despite the presence of a ventriculoperitoneal shunt catheter.  No interval change when compared with the prior CT. Remote areas of cerebral infarction as detailed above. Chronic microvascular ischemic changes within the periventricular white matter.    BOTOX PROCEDURE    PROCEDURE PERFORMED: Botulinum toxin injection (01038)    CLINICAL INDICATION: G43.719 NDC: [id number]    A time out was conducted just before the start of the procedure to verify the correct patient and procedure, procedure location, and all relevant critical information.       This is the first Botox injections and I am aiming for at least 50%  improvement in the patient's symptoms. Frequency of treatment is every 3 months unless no response to the treatments, at which time we will discontinue the injections.     DESCRIPTION OF PROCEDURE: After obtaining informed consent and under   aseptic technique, a total of 155 units of botulinum toxin type A were   injected in the following muscles: Procerus 5 units,  5 units   bilaterally, frontalis 20 units, temporalis 20 units bilaterally,   occipitalis 15 units, upper cervical paraspinals 10 units bilaterally and trapezius 15 units bilaterally. The patient was given a total of 155 units in 31 sites.The patient tolerated the procedure well. There were no complications. The patient was given a prescription for repeat treatment in 3 months.     Unavoidable waste 45 units    Michelle Heaton M.D  Medical Director, Headache and Facial Pain  [facility]        Assessment and Plan   Multiple aneurysms status post rupture and complicating hydrocephalus ligated  in April 2015. MRI reveals ventricular dilation which is now proven to be stable as of 2/2016. Will repeat given the intensity of the headaches.  Botox authorization obtained. First treatment today  Toradol 30 mg IV today and Zofran 4 mg IV today  Hypertension noted. Will increase Lisinopril to 40 mg daily    Insomnia  Depression  Anxiety  Intractable nausea  RTC in 3 months    Michelle Heaton M.D  Medical Director, Headache and Facial Pain  [facility]

## 2017-05-18 ENCOUNTER — TELEPHONE (OUTPATIENT)
Dept: NEUROLOGY | Facility: CLINIC | Age: 62
End: 2017-05-18

## 2017-05-18 RX ORDER — MIRTAZAPINE 45 MG/1
45 TABLET, FILM COATED ORAL NIGHTLY
COMMUNITY
End: 2017-05-24 | Stop reason: ALTCHOICE

## 2017-05-18 NOTE — TELEPHONE ENCOUNTER
----- Message from Ana Hair sent at 5/18/2017 10:14 AM CDT -----  Contact: self   Patient wants to inform your office she no longer take ABILIFY, patient now taking mirtazipine 45mg, [email or web address], any questions please call back at [phone]

## 2017-05-24 ENCOUNTER — OFFICE VISIT (OUTPATIENT)
Dept: NEUROLOGY | Facility: CLINIC | Age: 62
End: 2017-05-24
Payer: MEDICARE

## 2017-05-24 VITALS
HEART RATE: 110 BPM | DIASTOLIC BLOOD PRESSURE: 98 MMHG | BODY MASS INDEX: 30.48 KG/M2 | HEIGHT: 64 IN | WEIGHT: 178.56 LBS | SYSTOLIC BLOOD PRESSURE: 136 MMHG | RESPIRATION RATE: 20 BRPM

## 2017-05-24 DIAGNOSIS — G43.711 CHRONIC MIGRAINE WITHOUT AURA, WITH INTRACTABLE MIGRAINE, SO STATED, WITH STATUS MIGRAINOSUS: ICD-10-CM

## 2017-05-24 PROCEDURE — 96374 THER/PROPH/DIAG INJ IV PUSH: CPT | Mod: S$GLB,,, | Performed by: PSYCHIATRY & NEUROLOGY

## 2017-05-24 PROCEDURE — 1160F RVW MEDS BY RX/DR IN RCRD: CPT | Mod: S$GLB,,, | Performed by: PSYCHIATRY & NEUROLOGY

## 2017-05-24 PROCEDURE — 99999 PR PBB SHADOW E&M-EST. PATIENT-LVL III: CPT | Mod: PBBFAC,,, | Performed by: PSYCHIATRY & NEUROLOGY

## 2017-05-24 PROCEDURE — 96372 THER/PROPH/DIAG INJ SC/IM: CPT | Mod: 59,S$GLB,, | Performed by: PSYCHIATRY & NEUROLOGY

## 2017-05-24 PROCEDURE — 3075F SYST BP GE 130 - 139MM HG: CPT | Mod: S$GLB,,, | Performed by: PSYCHIATRY & NEUROLOGY

## 2017-05-24 PROCEDURE — 3080F DIAST BP >= 90 MM HG: CPT | Mod: S$GLB,,, | Performed by: PSYCHIATRY & NEUROLOGY

## 2017-05-24 RX ORDER — HYDROCODONE BITARTRATE AND ACETAMINOPHEN 7.5; 325 MG/1; MG/1
1 TABLET ORAL EVERY 6 HOURS PRN
Qty: 20 TABLET | Refills: 0 | Status: SHIPPED | OUTPATIENT
Start: 2017-05-24 | End: 2017-06-28 | Stop reason: SDUPTHER

## 2017-05-24 RX ORDER — ONDANSETRON 2 MG/ML
4 INJECTION INTRAMUSCULAR; INTRAVENOUS ONCE
Status: COMPLETED | OUTPATIENT
Start: 2017-05-24 | End: 2017-05-24

## 2017-05-24 RX ORDER — LATANOPROST 50 UG/ML
SOLUTION/ DROPS OPHTHALMIC
Qty: 1 BOTTLE | Refills: 1 | Status: SHIPPED | OUTPATIENT
Start: 2017-05-24

## 2017-05-24 RX ORDER — KETOROLAC TROMETHAMINE 30 MG/ML
30 INJECTION, SOLUTION INTRAMUSCULAR; INTRAVENOUS
Status: COMPLETED | OUTPATIENT
Start: 2017-05-24 | End: 2017-05-24

## 2017-05-24 RX ADMIN — ONDANSETRON 4 MG: 2 INJECTION INTRAMUSCULAR; INTRAVENOUS at 04:05

## 2017-05-24 RX ADMIN — KETOROLAC TROMETHAMINE 30 MG: 30 INJECTION, SOLUTION INTRAMUSCULAR; INTRAVENOUS at 04:05

## 2017-05-24 NOTE — PROGRESS NOTES
"Subjective:       Patient ID: Maral Archibald is a 60 y.o. female.    Chief Complaint: Headache  INTERVAL HISTORY  The patient is reporting persistent headache. Last visit I injected her with the first round of Botox, 1 week ago. She states her head is about to explode. Last visit I increased lisinopril to 40 mg, her blood pressure remains elevated but it is better today. She reports significant nausea, constant, she is using phenergan suppositories. She inquired about Marinol last week, I explained that at this time, we have to try Botox and reserve Marinol as a last resort. Today she inquires about Suboxone, I explained that that is a treatment for drug addiction, a special license is needed to prescribe it.  HPI  The patient is a 62 y/o woman with severe intractable headaches that "feel as they did when diagnosed with hydrocephalus" but is now proven to have stable ventriculomegaly. The patient has remote history of intracranial ruptured aneurysms. She had a shunt on the right ventricle that was switched to the left. Last April 2016 it was ligated as it was over draining.  She has been to the ED twice in the last 2 months. The headache is mainly on the vertex associated with nausea not responsive to phenergan. Recent imaging studies show stable ventriculomegaly. Dr. Patel has seen the patient and performed a peripheral ligation at the cervical level. In addition, the patient suffers with severe neck pain. She had recent RFA with no relief. She has been offered a Spinal Cord Stimulator. She is concerned regarding her 20% copay of a $40,000.00 bill.  Otherwise, the information below is accurate and current     Headache questionnaire   1. When did your Headaches start?   Over a year ago   2. Where are your headaches located?   Exactly on top of head with feelings of extreme pressure   3. Are your headaches preceded or accompanied by other symptoms? yes   If yes, please describe. Nausea, sore throat, tight neck   4. " Please gege the word that best describes your headache's intensity:   severe   5. Your headache's characteristics:   Excruciating, Pressure   6. Using a scale of 1 through 10, with 0 = no pain and 10 = the worst pain:   What score is your headache now? 8   What score is your headache at its worst? 10   What score is your headache at its best? 5   7. How long does the headache last?   Every day all day   8. How often does the headache occur?   daily   9. Possible associated headache symptoms:   Sensitivity to light, Sensitivity to noise, Blurred vision,   Loss of appetite, Nausea, Dizziness, Vertigo,   Irritability, Anger, Anxiety, Nasal or sinus pressure/ pain,   Problems with concentration,   Problems with memory, Problems with task completion,   Problems with relaxation, Neck tightness, Neck pain,   Dark floaters, problems swallowing, facial pain   10. Does the headache awaken you at night? yes   If so, how often? Not every night   11. Headache improving factors:   Darkness, Ice,   Medications: Norco, Toradol, BC powder with Coke or caffeine   12. Headache worsening factors:   Fatigue, Light, Noise, Bending over,   Changes in weather, Stress                  Review of Systems   Constitutional: Positive for diaphoresis (When working outside) and fatigue. Negative for fever, activity change and appetite change.   HENT: Positive for sinus pressure and sore throat (Dry mouth). Negative for congestion, dental problem, hearing loss, tinnitus, trouble swallowing and voice change.    Eyes: Positive for photophobia, pain (Headache behind the eyes) and visual disturbance (Floaters). Negative for redness.   Respiratory: Positive for cough and wheezing. Negative for chest tightness and shortness of breath.    Cardiovascular: Negative for chest pain, palpitations and leg swelling.   Gastrointestinal: Positive for nausea and constipation. Negative for vomiting, abdominal pain and blood in stool.   Endocrine: Negative for cold  intolerance and heat intolerance.   Genitourinary: Negative for urgency, frequency, difficulty urinating and menstrual problem.   Musculoskeletal: Positive for joint swelling (Lately, mild at the ankles), neck pain and neck stiffness. Negative for myalgias, back pain, arthralgias and gait problem.   Skin: Negative.    Neurological: Positive for dizziness, light-headedness and headaches. Negative for tremors, seizures, syncope, facial asymmetry, speech difficulty, weakness and numbness.   Hematological: Negative for adenopathy. Does not bruise/bleed easily.   Psychiatric/Behavioral: Positive for confusion, sleep disturbance, dysphoric mood (Depression and anxiety), decreased concentration and agitation. Negative for suicidal ideas, behavioral problems and self-injury. The patient is not nervous/anxious and is not hyperactive.          Past Medical History   Diagnosis Date    Hypertension     Hepatitis C     Allergy     Headache(784.0)     Seizures     Bipolar disorder      type I    Closed head injury     Stroke      aneurysm, cerebral     Past Surgical History   Procedure Laterality Date     section, classic      Tonsillectomy, adenoidectomy, bilateral myringotomy and tubes      Cholecystectomy      Cerebral aneurysm repair      Total abdominal hysterectomy      Tonsillectomy      Adenoidectomy      Lasik       OU x 10 yrs    Shunt removal Left      History     Social History    Marital Status:      Spouse Name: N/A     Number of Children: 0    Years of Education: N/A     Occupational History    Not on file.     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  Maral is able to drive.  She does  not work outside of her home.      Allergies   Allergen Reactions    Sulfa (Sulfonamide Antibiotics) Nausea Only         Current Outpatient Prescriptions   Medication Sig    aripiprazole (ABILIFY) 10 MG Tab Take 10 mg by mouth.    clonazePAM (KLONOPIN) 1 MG tablet TAKE 1 TABLET EVERY DAY AS NEEDED    felodipine (PLENDIL) 10 MG 24 hr tablet Take 1 tablet (10 mg total) by mouth once daily.    hydrochlorothiazide (MICROZIDE) 12.5 mg capsule TAKE 1 CAPSULE ONE TIME DAILY    lamotrigine (LAMICTAL) 200 MG tablet Take 1 tablet (200 mg total) by mouth once daily.    latanoprost 0.005 % ophthalmic solution INSTILL 1 DROP INTO BOTH EYES EVERY EVENING    lisinopril (PRINIVIL,ZESTRIL) 20 MG tablet Take 1 tablet (20 mg total) by mouth once daily.    melatonin 5 mg Cap Take 5 mg by mouth every evening.     meloxicam (MOBIC) 15 MG tablet Take 1 tablet (15 mg total) by mouth once daily.    multivitamin (ONE DAILY MULTIVITAMIN) per tablet Take 1 tablet by mouth once daily.    polyethylene glycol (GLYCOLAX) 17 gram/dose powder Take 17 g by mouth daily as needed.    promethazine (PHENERGAN) 25 MG tablet TAKE 1 TABLET (25 MG TOTAL) BY MOUTH EVERY 6 (SIX) HOURS AS NEEDED FOR NAUSEA.    RESTASIS 0.05 % ophthalmic emulsion INSTILL 1 DROP INTO BOTH EYES TWICE DAILY    sumatriptan (IMITREX) 100 MG tablet TAKE 1 TABLET EVERY DAY IF NEEDED FOR MIGRAINE    venlafaxine (EFFEXOR-XR) 150 MG Cp24 Take 1 capsule (150 mg total) by mouth every morning.     No current facility-administered medications for this visit.        No current facility-administered medications for this visit.     Objective:      Vitals:    05/24/17 1536   BP: (!) 136/98   Pulse: 110   Resp: 20     Body mass index is 30.65 kg/m².      Physical Exam  Alert and oriented in distress  Constitutional:     HENT:    Head: Evidence of old well healed craniotomy over rigth frontoparietal regiontraumatic, oral and nasal mucosa intact  Eyes: Conjunctivae and EOM are  normal. Pupils are equal, round, and reactive to light OU  Neck: Neck supple. No thyromegaly present  Cardiovascular: Normal rate and normal heart sounds  No murmur heard  Pulmonary/Chest: Effort normal and breath sounds normal  Musculoskeletal:Decreased cervical range of motion. Significant cervical paraspinal muscle spasm.  Skin: Skin is warm and dry  Psychiatric: Dysphoric mood and flat affect     Neuro exam:    Mental status:  Awake, attentive, Alert, oriented to self, place, year and month  Language function is intact  Naming, repetition and spontaneous meaningful speech expression are intact  Speech fluency is good and speech is clear  Remote and recent memory are good  Patient able to count backwards by 7    No findings to suggest executive dysfuntion    Patient has adequate insight    Mood is dysphoric    Cranial Nerves:  Smell was not formally evaluated  Cranial Nerves II - XII: intact  Pursuits were smooth, normal saccades, no nystagmus OU  Funduscopic exam - disc were flat and pink, no exudates or hemorrhages OU  Motor - facial movement was symmetrical and normal     Palate moved well and was symmetrical with normal palatal and oral sensation  Tongue movements were full    Coordination:     Rapid alternating movements and rapid finger tapping - normal bilaterally  Finger to nose - normal and symmetric bilaterally   Heel to shin test - normal and symmetric bilaterally   Arm roll - smooth and symmetric   No intentional or positional tremor.     Motor:  Normal muscle bulk and symmetry. No fasciculations were noted    No pronator drift  Strength of shoulder abduction, elbow flexion, wrist extension, elbow extension, finger flexion and hand intrinsics were 5/5 bilaterally    Strength of hip flexion, knee extension, knee flexion, ankle dorsiflexion, ankle plantarflexion were 5/5 bilaterally     Reflexes:  Tendon reflexes were 2 + at biceps, triceps, brachioradialis, patellar, and Achilles bilaterally  On  plantar stimulation toes were down going bilaterally  No clonus was noted     Sensory: Intact to light touch, pin prick in all extremities. Vibration and proprioception intact in all extremities.     Gait: Romberg absent. Normal gait. Normal arm swing and turns. Normal postural reflexes.     Review of Data:  Brain MRI: Enlargement of the lateral ventricles despite the presence of a ventriculoperitoneal shunt catheter.  No interval change when compared with the prior CT. Remote areas of cerebral infarction as detailed above. Chronic microvascular ischemic changes within the periventricular white matter.      Assessment and Plan   Multiple aneurysms status post rupture and complicating hydrocephalus ligated in April 2015. MRI reveals ventricular dilation which is now proven to be stable as of 2/2016. Will repeat given the intensity of the headaches.  First Botox treatment given on 5/17/2017  Toradol 30 mg IV today and Zofran 4 mg IV today  Hypertension noted. Will increase Lisinopril to 40 mg daily    Insomnia  Depression  Anxiety  Intractable nausea  RTC in 3 months    Michelle Heaton M.D  Medical Director, Headache and Facial Pain  [facility]

## 2017-05-25 ENCOUNTER — DOCUMENTATION ONLY (OUTPATIENT)
Dept: OPHTHALMOLOGY | Facility: CLINIC | Age: 62
End: 2017-05-25

## 2017-05-25 NOTE — LETTER
May 25, 2017    Maral Archibald  [address] [place] [address]             [facility]  [address]  [place] [address]  Phone: [phone]  Fax: [phone] Dear Mrs. Archibald:    Our records indicate that you are due for a glaucoma eye exam.   Records show that you have been cancelling appointments that have been scheduled for you over the past year.  Dr Fam has sent out a letter 3/21/2017 to the above address with no response.    Untreated glaucoma leads to vision loss and blindness. If diagnosed early, glaucoma can be easily treated and disease progression can be significantly delayed or prevented.    Please make an appointment for a glaucoma eye exam at your earliest convenience.  If we do not hear from you soon  Dr Fam will need to send out a letter to seek care elsewhere.    Sincerely,        Suzy Roberts

## 2017-05-30 DIAGNOSIS — I10 ESSENTIAL HYPERTENSION: ICD-10-CM

## 2017-05-30 RX ORDER — FELODIPINE 10 MG/1
10 TABLET, EXTENDED RELEASE ORAL DAILY
Qty: 90 TABLET | Refills: 3 | Status: SHIPPED | OUTPATIENT
Start: 2017-05-30 | End: 2018-05-04 | Stop reason: SDUPTHER

## 2017-05-30 RX ORDER — HYDROCHLOROTHIAZIDE 12.5 MG/1
12.5 CAPSULE ORAL DAILY
Qty: 90 CAPSULE | Refills: 3 | Status: SHIPPED | OUTPATIENT
Start: 2017-05-30 | End: 2018-05-04 | Stop reason: SDUPTHER

## 2017-06-01 RX ORDER — OMEPRAZOLE 20 MG/1
CAPSULE, DELAYED RELEASE ORAL
Qty: 90 CAPSULE | Refills: 3 | Status: SHIPPED | OUTPATIENT
Start: 2017-06-01 | End: 2018-05-04 | Stop reason: SDUPTHER

## 2017-06-01 RX ORDER — CYCLOSPORINE 0.5 MG/ML
EMULSION OPHTHALMIC
Qty: 180 VIAL | Refills: 2 | Status: SHIPPED | OUTPATIENT
Start: 2017-06-01

## 2017-06-05 ENCOUNTER — HOSPITAL ENCOUNTER (OUTPATIENT)
Dept: RADIOLOGY | Facility: HOSPITAL | Age: 62
Discharge: HOME | End: 2017-06-05
Attending: INTERNAL MEDICINE
Payer: MEDICARE

## 2017-06-05 DIAGNOSIS — B18.2 HEPATITIS C, CHRONIC: ICD-10-CM

## 2017-06-05 PROCEDURE — 76700 US EXAM ABDOM COMPLETE: CPT | Mod: TC,PO

## 2017-06-05 PROCEDURE — 76700 US EXAM ABDOM COMPLETE: CPT | Mod: 26,,, | Performed by: RADIOLOGY

## 2017-06-06 ENCOUNTER — TELEPHONE (OUTPATIENT)
Dept: NEUROLOGY | Facility: CLINIC | Age: 62
End: 2017-06-06

## 2017-06-06 NOTE — TELEPHONE ENCOUNTER
----- Message from Shivani Wagner sent at 6/6/2017 12:17 PM CDT -----  Contact: Maren with [facility] # [phone] ext [phone]  Maren with [facility] mail [facility] ph# [phone] ext [phone]  Please call in regards to the norco 7.5 prescription that was sent over

## 2017-06-07 ENCOUNTER — TELEPHONE (OUTPATIENT)
Dept: NEUROLOGY | Facility: CLINIC | Age: 62
End: 2017-06-07

## 2017-06-07 NOTE — TELEPHONE ENCOUNTER
----- Message from Shivani Wagner sent at 6/7/2017  2:22 PM CDT -----  Contact: [phone]  Patient is requesting a call back from the nurse requesting to come in asap to get an injection.  Please call the patient upon request at phone number [phone].

## 2017-06-07 NOTE — TELEPHONE ENCOUNTER
----- Message from Thi Pink sent at 6/7/2017  2:30 PM CDT -----  Patient would like to discuss getting an IV of Toradol for her migraine. Please call patient back at [phone]. Patient was disconnected, I tried to call her back but she doesn't have voice mail set up yet.     Thank you.

## 2017-06-07 NOTE — TELEPHONE ENCOUNTER
----- Message from Negrita Martinez sent at 6/7/2017  2:35 PM CDT -----  Pt is asking to get a Toradol inj ... Call [phone] (home)

## 2017-06-07 NOTE — TELEPHONE ENCOUNTER
----- Message from Adriane Lutz sent at 6/7/2017  2:43 PM CDT -----  Contact: self  Pt wanting to know if she can be put on Dr. Liz schedule tomorrow for an injection bc she is having severe headaches. Please call to pt to advise

## 2017-06-08 ENCOUNTER — HOSPITAL ENCOUNTER (OUTPATIENT)
Dept: RADIOLOGY | Facility: HOSPITAL | Age: 62
Discharge: HOME | End: 2017-06-08
Attending: PSYCHIATRY & NEUROLOGY
Payer: MEDICARE

## 2017-06-08 DIAGNOSIS — I60.7 CEREBRAL ANEURYSM RUPTURE: ICD-10-CM

## 2017-06-08 PROCEDURE — 25500020 PHARM REV CODE 255: Mod: PO | Performed by: PSYCHIATRY & NEUROLOGY

## 2017-06-08 PROCEDURE — 70553 MRI BRAIN STEM W/O & W/DYE: CPT | Mod: TC,PO

## 2017-06-08 PROCEDURE — A9585 GADOBUTROL INJECTION: HCPCS | Mod: PO | Performed by: PSYCHIATRY & NEUROLOGY

## 2017-06-08 PROCEDURE — 70553 MRI BRAIN STEM W/O & W/DYE: CPT | Mod: 26,,, | Performed by: RADIOLOGY

## 2017-06-08 RX ORDER — GADOBUTROL 604.72 MG/ML
8 INJECTION INTRAVENOUS
Status: COMPLETED | OUTPATIENT
Start: 2017-06-08 | End: 2017-06-08

## 2017-06-08 RX ADMIN — GADOBUTROL 8 ML: 604.72 INJECTION INTRAVENOUS at 08:06

## 2017-06-12 ENCOUNTER — TELEPHONE (OUTPATIENT)
Dept: NEUROLOGY | Facility: CLINIC | Age: 62
End: 2017-06-12

## 2017-06-12 NOTE — TELEPHONE ENCOUNTER
----- Message from Kris Brown sent at 6/10/2017 11:09 AM CDT -----  Contact: pt  Pt is calling about her prescription for Norco (pt is in a lot of pain)  Call Back#[phone]  Thanks    [facility] - [place], [place] - [address]  [address] [address]  Phone: [phone] Fax: [phone]

## 2017-06-12 NOTE — TELEPHONE ENCOUNTER
Returned call. No answer. Left message asking patient to call back.     Message sent through patient portal as well.

## 2017-06-12 NOTE — TELEPHONE ENCOUNTER
----- Message from Stephanie Davila sent at 6/12/2017 11:48 AM CDT -----  Contact: self   [phone]  Patient called asking to speak with the nurse regarding the cancellation for refill    rx norco. Patient states she had a migraine since last month. Patient is schedule to see Dr Molina on 06/27/2017. Patient says she need the rx. Thanks!

## 2017-06-12 NOTE — TELEPHONE ENCOUNTER
----- Message from Adriane Hernandez sent at 6/12/2017  9:30 AM CDT -----  Contact: self  Call placed to pod. States the Norco was withdrawn, and would like to know why.  Doesn't see Dr Molina until 6/27 and she is in excruciating pain.  Please call back her friend Jorge Hammonds at   Patient will be getting a new cell phone and number today.

## 2017-06-12 NOTE — TELEPHONE ENCOUNTER
Spoke with pt, informed her the reason for the cancelled Norco prescription is due to the recent refill of Norco 10mg by Dr. Pino. Informed her that Dr. Heaton does not prescribe this medication freely and will not continue to do so as it makes headaches worse. The pt verbalized understanding, stated she must have misplaced the prescription from Dr. Pino due to her mother is very sick (dying) and she is in the process of moving. Requests a different medication to treat migraine that started on 05-26-17 with no relief. Please advise.

## 2017-06-12 NOTE — TELEPHONE ENCOUNTER
----- Message from Maria Del Carmen Lara sent at 6/12/2017  3:01 PM CDT -----  Contact: self  Patient [phone] is calling to speak with Nurse about her migraine that she has had since 05 27 17/patient does not have an appt with Dr Molina until 06 27 17 and does not have any transportation/she is asking if Dr Heaton would please send the prescription for [place] to the [facility]?/please advise    [facility] - [place], [place] - [address]  [address] [address]  Phone: [phone] Fax: [phone]

## 2017-06-14 ENCOUNTER — TELEPHONE (OUTPATIENT)
Dept: NEUROLOGY | Facility: CLINIC | Age: 62
End: 2017-06-14

## 2017-06-14 ENCOUNTER — PROCEDURE VISIT (OUTPATIENT)
Dept: NEUROLOGY | Facility: CLINIC | Age: 62
End: 2017-06-14
Payer: MEDICARE

## 2017-06-14 VITALS
HEART RATE: 97 BPM | WEIGHT: 178.56 LBS | BODY MASS INDEX: 30.48 KG/M2 | HEIGHT: 64 IN | RESPIRATION RATE: 18 BRPM | SYSTOLIC BLOOD PRESSURE: 153 MMHG | DIASTOLIC BLOOD PRESSURE: 100 MMHG

## 2017-06-14 DIAGNOSIS — G43.711 CHRONIC MIGRAINE WITHOUT AURA, WITH INTRACTABLE MIGRAINE, SO STATED, WITH STATUS MIGRAINOSUS: ICD-10-CM

## 2017-06-14 PROCEDURE — 99213 OFFICE O/P EST LOW 20 MIN: CPT | Mod: 25,S$GLB,, | Performed by: PSYCHIATRY & NEUROLOGY

## 2017-06-14 PROCEDURE — 96374 THER/PROPH/DIAG INJ IV PUSH: CPT | Mod: S$GLB,,, | Performed by: PSYCHIATRY & NEUROLOGY

## 2017-06-14 RX ORDER — HYDROCODONE BITARTRATE AND ACETAMINOPHEN 10; 325 MG/1; MG/1
TABLET ORAL
COMMUNITY
Start: 2017-05-24 | End: 2018-09-26

## 2017-06-14 RX ORDER — KETOROLAC TROMETHAMINE 30 MG/ML
30 INJECTION, SOLUTION INTRAMUSCULAR; INTRAVENOUS ONCE
Status: COMPLETED | OUTPATIENT
Start: 2017-06-14 | End: 2017-06-14

## 2017-06-14 RX ADMIN — KETOROLAC TROMETHAMINE 30 MG: 30 INJECTION, SOLUTION INTRAMUSCULAR; INTRAVENOUS at 04:06

## 2017-06-14 NOTE — PROCEDURES
Procedures     The patient is added to the clinic because of complaint of severe headache, present for over 3 days and refractory to acute medications. He is on   prevention and  for acute treatment.      ROS: Positive for photophobia, phonophobia, nausea, insomnia     Past Medical History:   Diagnosis Date    Allergy     Bipolar disorder     type I    Closed head injury     Glaucoma     Headache     Hepatitis C     Hydrocephalus     Hypertension     Nuclear sclerosis of both eyes 5/12/2016    Seizures     Stroke     aneurysm, cerebral    Trouble in sleeping          Current Outpatient Prescriptions   Medication Sig    clonazePAM (KLONOPIN) 1 MG tablet TAKE 1 TABLET EVERY DAY AS NEEDED    felodipine (PLENDIL) 10 MG 24 hr tablet Take 1 tablet (10 mg total) by mouth once daily.    hydrochlorothiazide (MICROZIDE) 12.5 mg capsule Take 1 capsule (12.5 mg total) by mouth once daily.    hydrocodone-acetaminophen 10-325mg (NORCO)  mg Tab     hydrocodone-acetaminophen 7.5-325mg (NORCO) 7.5-325 mg per tablet Take 1 tablet by mouth every 6 (six) hours as needed for Pain.    lamotrigine (LAMICTAL) 200 MG tablet Take 1 tablet (200 mg total) by mouth once daily.    latanoprost 0.005 % ophthalmic solution INSTILL 1 DROP INTO BOTH EYES EVERY EVENING    lisinopril (PRINIVIL,ZESTRIL) 20 MG tablet Take 2 tablets (40 mg total) by mouth once daily.    melatonin 5 mg Cap Take 5 mg by mouth every evening.     meloxicam (MOBIC) 15 MG tablet Take 1 tablet (15 mg total) by mouth once daily.    multivitamin (ONE DAILY MULTIVITAMIN) per tablet Take 1 tablet by mouth once daily.    omeprazole (PRILOSEC) 20 MG capsule TAKE 1 CAPSULE (20 MG TOTAL) BY MOUTH ONCE DAILY.    polyethylene glycol (GLYCOLAX) 17 gram/dose powder Take 17 g by mouth daily as needed.    promethazine (PHENERGAN) 25 MG tablet TAKE 1 TABLET (25 MG TOTAL) BY MOUTH EVERY 6 (SIX) HOURS AS NEEDED FOR NAUSEA.    RESTASIS 0.05 % ophthalmic emulsion  INSTILL 1 DROP INTO BOTH EYES TWICE DAILY    sumatriptan (IMITREX) 100 MG tablet TAKE 1 TABLET EVERY DAY IF NEEDED FOR MIGRAINE    venlafaxine (EFFEXOR-XR) 150 MG Cp24 Take 1 capsule (150 mg total) by mouth every morning.     No current facility-administered medications for this visit.      Review of patient's allergies indicates:   Allergen Reactions    Sulfa (sulfonamide antibiotics) Nausea Only     Vitals:    06/14/17 1604   BP: (!) 153/100   Pulse: 97   Resp: 18     Body mass index is 30.65 kg/m².      Alert and fully oriented in distress  Lungs CTA  Heart RRR  CN II-XII intact  Motor normal bulk and tone, symmetric strength  Coordination intact FTN  Sensory in tact to LT  Gait: normal, pace and base     Data review:  Brain MRI: Evidence of prior craniotomy and ventriculostomy catheter with multiple areas of cortical and subcortical supratentorial encephalomalacia as is described above. White matter changes Ethmoid and maxillary sinus disease. No evidence of residual mass, abnormal enhancement or restricted diffusion.     I injected 30 mg of IV diluted in NS very slow push.     A/P:     Status Migrainosus, headache present for over 72 hours, refractory to her usual medication. Added to the clinic for IV Toradol  Encouraged the patient to comply with preventives and early acute intervention for escalations  Hypertension noted. Advised to discuss with PCP  RTC as scheduled

## 2017-06-14 NOTE — TELEPHONE ENCOUNTER
----- Message from Yobani Sanches sent at 6/14/2017  9:07 AM CDT -----  Contact: same  Patient called in and requested a message be sent regarding why her head is hurting?  Patient stated she just realized today that she has had a headache her entire life.    Patient call back number is [phone]

## 2017-06-26 DIAGNOSIS — G43.711 CHRONIC MIGRAINE WITHOUT AURA, WITH INTRACTABLE MIGRAINE, SO STATED, WITH STATUS MIGRAINOSUS: Primary | ICD-10-CM

## 2017-06-26 NOTE — TELEPHONE ENCOUNTER
----- Message from Apurva Amor sent at 6/26/2017  9:26 AM CDT -----  Patient needs a refill of medication:Phenergan 25 mg called into [facility] pharmacy on [address]. Please order and call patient back at [phone] to confirm. Thanks!

## 2017-06-26 NOTE — TELEPHONE ENCOUNTER
Spoke with pt, stated that she doesn't see Dr. Sifuentes anymore. Patient is complaining of Nausea and needs refill.

## 2017-06-27 ENCOUNTER — TELEPHONE (OUTPATIENT)
Dept: NEUROLOGY | Facility: CLINIC | Age: 62
End: 2017-06-27

## 2017-06-27 ENCOUNTER — OFFICE VISIT (OUTPATIENT)
Dept: PAIN MEDICINE | Facility: CLINIC | Age: 62
End: 2017-06-27
Payer: MEDICARE

## 2017-06-27 VITALS
SYSTOLIC BLOOD PRESSURE: 120 MMHG | HEART RATE: 78 BPM | RESPIRATION RATE: 18 BRPM | TEMPERATURE: 98 F | WEIGHT: 172 LBS | BODY MASS INDEX: 30.48 KG/M2 | HEIGHT: 63 IN | DIASTOLIC BLOOD PRESSURE: 80 MMHG

## 2017-06-27 DIAGNOSIS — Z86.69 HISTORY OF HYDROCEPHALUS: ICD-10-CM

## 2017-06-27 DIAGNOSIS — M50.30 DDD (DEGENERATIVE DISC DISEASE), CERVICAL: ICD-10-CM

## 2017-06-27 DIAGNOSIS — G44.59 COMPLICATED HEADACHE SYNDROMES: Primary | ICD-10-CM

## 2017-06-27 DIAGNOSIS — Z86.69 HISTORY OF MIGRAINE HEADACHES: Chronic | ICD-10-CM

## 2017-06-27 DIAGNOSIS — Z98.890 HISTORY OF CEREBRAL ANEURYSM REPAIR: Chronic | ICD-10-CM

## 2017-06-27 DIAGNOSIS — Z86.79 HISTORY OF CEREBRAL ANEURYSM REPAIR: Chronic | ICD-10-CM

## 2017-06-27 DIAGNOSIS — F31.9 BIPOLAR 1 DISORDER: Chronic | ICD-10-CM

## 2017-06-27 DIAGNOSIS — Z92.89 HISTORY OF VENTRICULOPERITONEAL SHUNTING: ICD-10-CM

## 2017-06-27 PROCEDURE — 99999 PR PBB SHADOW E&M-EST. PATIENT-LVL IV: CPT | Mod: PBBFAC,,, | Performed by: ANESTHESIOLOGY

## 2017-06-27 PROCEDURE — 99204 OFFICE O/P NEW MOD 45 MIN: CPT | Mod: S$GLB,,, | Performed by: ANESTHESIOLOGY

## 2017-06-27 RX ORDER — PROMETHAZINE HYDROCHLORIDE 25 MG/1
TABLET ORAL
Qty: 30 TABLET | Refills: 1 | Status: SHIPPED | OUTPATIENT
Start: 2017-06-27

## 2017-06-27 NOTE — PROGRESS NOTES
"This note was completed with dictation software and grammatical errors may exist.    CC: Bilateral headaches, neck pain    HPI: The patient is a 62 y/o woman with severe intractable headaches that "feel as they did when diagnosed with hydrocephalus" but is now proven to have stable ventriculomegaly.  She reports that she has been having neck pain and headaches for many years.  She states that she recently realize that she has been having headaches since she was a child.  The patient has remote history of intracranial ruptured aneurysms, and after an aneurysm in 1988 she developed hydrocephalus and had a shunt placed. She had a shunt on the right ventricle that was switched to the left. Last April 2016 it was ligated as it was over-draining.  It is unclear if her headaches have changed with various intracranial pressures but she denies any major change in her symptoms before and after closer of the shunt.   She has been to the ED twice in the last 2 months. The headache is mainly on the vertex associated with nausea not responsive to phenergan. Recent imaging studies show stable ventriculomegaly. Dr. Patel has seen the patient and performed a peripheral ligation at the cervical level.  She complains of constant headaches on the top of her head in the bilateral parietal region, somewhat on the occipital region as well.  She also complains of neck pain in her bilateral neck posteriorly radiating into the trapezius and scapular region.    Pain intervention history: She has recently had a Toradol and Zofran injections with excellent relief lasting one day at a time.  She has had cervical medial branch radiofrequency ablations without any relief.  She reports that she was previously taking Norco 3 times a day with 100% relief, but I questioned why she was continuing to undergo procedures and complain of headaches when she was having "100% relief ".  She could not answer this.  In the past she had seen Dr. Krishnan, neurology " "and recalls trying Topamax, sumatriptan but also has taken other preventative migraine medications for which she cannot recall.    ROS: She reports chills, headaches, head trauma, eye pain, ear pain, hoarse voice, nausea, easy bruising, back pain, seizures, memory loss, dizziness, difficulty sleeping, anxiety and depression.  Balance of review of systems is negative.    Past Medical History:   Diagnosis Date    Allergy     Bipolar disorder     type I    Closed head injury     Glaucoma     Headache     Hepatitis C     Hydrocephalus     Hypertension     Nuclear sclerosis of both eyes 2016    Seizures     Stroke     aneurysm, cerebral    Trouble in sleeping        Past Surgical History:   Procedure Laterality Date    ADENOIDECTOMY      CEREBRAL ANEURYSM REPAIR       SECTION, CLASSIC      CHOLECYSTECTOMY      LASIK      OU x 10 yrs    SHUNT REMOVAL Left     TONSILLECTOMY      TONSILLECTOMY, ADENOIDECTOMY, BILATERAL MYRINGOTOMY AND TUBES      TOTAL ABDOMINAL HYSTERECTOMY         Social History     Social History    Marital status:      Spouse name: N/A    Number of children: 0    Years of education: N/A     Social History Main Topics    Smoking status: Former Smoker     Types: Cigarettes     Quit date: 1984    Smokeless tobacco: Never Used    Alcohol use No    Drug use: No    Sexual activity: Not Currently     Other Topics Concern    Not on file     Social History Narrative    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  Maral is able to drive.  She does not work outside of her home.          Medications/Allergies: See med card    Vitals:    17 0950   BP: 120/80   Pulse: 78   Resp: 18   Temp: 97.8 °F (36.6 °C)   TempSrc: Oral   Weight: 78 kg (172 lb)   Height: 5' 3" (1.6 m)   PainSc: 10-Worst pain ever   PainLoc: Head   Body mass index is 30.47 kg/m².      Physical exam:  Gen: A and O x3, pleasant, well-groomed  Skin: No rashes or obvious " lesions  HEENT: PERRLA, no obvious deformities on ears or in canals.Trachea midline.  CVS: Regular rate and rhythm, normal palpable pulses.  Resp: Clear to auscultation bilaterally, no wheezes or rales.  Abdomen: Soft, NT/ND.  Musculoskeletal: Able to heel walk, toe walk. No antalgic gait.     Neuro:  Upper extremities: 5/5 strength bilaterally   Reflexes: Brachioradialis 2+, Bicep 2+, Tricep 2+.   Sensory: Intact and symmetrical to light touch and pinprick in C2-T1 dermatomes bilaterally.    Cervical Spine:  Cervical spine: Range of motion is mildly decreased with both flexion and extension and lateral rotation with complaint of increased pain in the bilateral cervical paraspinous muscles and trapezius muscles.  Spurling's maneuver causes neck pain to either side, does not seem to exacerbate her occipital or parietal pain.  Myofascial exam: Tenderness to palpation across cervical paraspinous region bilaterally, trapezius and scapular muscles bilaterally.    Imagin16 MRI Brain  1.  Normal magnetic resonance angiogram of the [place] vasculature.  No definite cerebral aneurysm appreciated.  2.  Enlargement of the lateral ventricles despite the presence of a ventriculoperitoneal shunt catheter.  No interval change when compared with the prior CT.  3.  Remote areas of cerebral infarction as detailed above  4.  Air-fluid level within the sphenoid sinus and maxillary sinuses compatible with acute sinusitis  5.  Chronic microvascular ischemic changes within the periventricular white matter which are greater than expected for the patient's chronologic age.    17 MRI Brain:  There again multiple areas of encephalomalacia including a large area of cystic encephalomalacia involving much of the right frontal lobe extending into the deep white matter suggesting enlarged prior right BRITTNY distribution CVA. There is also some extension into the right MCA territory. There is also evidence of a moderate-sized  "area of encephalomalacia in the left posterior parietal region suggesting a large previous posterior border zone distribution infarct. This extends into posterior temporal lobe as well. Additionally, there is evidence of prior right frontal cortical and subcortical encephalomalacia suggesting prior infarction. I do not see evidence of restricted diffusion. There is no evidence of mass effect. Flow voids are seen in the expected locations of the carotid and vertebrobasilar systems. There is mild ventriculomegaly and I do not see evidence of effacement of the sulci spaces. There is evidence of prior craniotomy on the right and a left frontal ventriculostomy catheter is seen with its tip in the region of the frontal horn the left lateral ventricle. The posterior fossa is unremarkable. There is some moderate periventricular white matter low density suggesting microvascular changes. The left frontal lobe there is also an area of subcortical encephalomalacia. I do not see evidence of abnormal enhanceme  nt. There is mucosal thickening in the left maxillary sinus there is patchy ethmoid sinus mucosal thickening bilaterally.    Assessment:  The patient is a 62 y/o woman with severe intractable headaches that "feel as they did when diagnosed with hydrocephalus" but is now proven to have stable ventriculomegaly.   1. Complicated headache syndromes     2. DDD (degenerative disc disease), cervical  MRI Cervical Spine Without Contrast   3. History of migraine headaches  MRI Cervical Spine Without Contrast   4. History of cerebral aneurysm repair     5. bipolar disorder type I     6. History of hydrocephalus  MRI Cervical Spine Without Contrast   7. History of ventriculoperitoneal shunting           Plan:  1.  We discussed getting cervical spine imaging prior to any further interventions.  I suspect that she may have some mild facet arthropathy and DDD but I want to make sure she does not have any stenosis which could cause " headaches.  Likely she does not.  If this turns out to simply be complicated headache and migraine syndrome, occipital nerve stimulation would likely be beneficial.  I would consider placing both occipital and cervical leads.  I will have her follow-up in a couple weeks to review this.  2.  As far as medications, Dr. Heaton has been working on this with her.  She had previously given her a prescription for hydrocodone, I'm not sure if Dr. Heaton wants her to continue taking this or not and I will discuss this with her.    Thank you for referring this interesting patient, and I look forward to continuing to collaborate in her care.

## 2017-06-27 NOTE — TELEPHONE ENCOUNTER
Spoke with the pt, she requests a refill of her [place]. Pt saw Dr. Molina this morning, he states in his note that he is unsure if Dr. Heaton wants her to continue this medication and he plans to speak with Dr. Heaton about it. Please advise if the pt should continue Norco.

## 2017-06-27 NOTE — LETTER
June 30, 2017      Shaniqua Heaton MD  [address] [place] [address] Dr Terrie NARAYAN [id number]           [place] [facility]  [address] [facility] [address] [place] [address]  Phone: [phone]          Patient: Maral Archibald   MR Number: [id number]   YOB: 1955   Date of Visit: 6/27/2017       Dear Dr. Shaniqua Heaton:    Thank you for referring Maral Archibald to me for evaluation. Attached you will find relevant portions of my assessment and plan of care.    If you have questions, please do not hesitate to call me. I look forward to following Maral Archibald along with you.    Sincerely,    Jono Molina MD    Enclosure  CC:  No Recipients    If you would like to receive this communication electronically, please contact [email or web address] or [phone] to request more information on [employer] Link access.    For providers and/or their staff who would like to refer a patient to [facility], please contact us through our one-stop-shop provider referral line, [facility], at [phone].    If you feel you have received this communication in error or would no longer like to receive these types of communications, please e-mail [email or web address]

## 2017-06-27 NOTE — TELEPHONE ENCOUNTER
----- Message from Jessica Garza sent at 6/27/2017 10:34 AM CDT -----  Contact: Pt  Pt is requesting to have a refill on Norco 10/325mg. Pt is currently at another appt at [facility], so pt states to pls send the rx to [facility] at the [facility]. Pt can be reached at [phone].

## 2017-06-28 ENCOUNTER — HOSPITAL ENCOUNTER (OUTPATIENT)
Dept: RADIOLOGY | Facility: HOSPITAL | Age: 62
Discharge: HOME | End: 2017-06-28
Attending: ANESTHESIOLOGY
Payer: MEDICARE

## 2017-06-28 ENCOUNTER — TELEPHONE (OUTPATIENT)
Dept: NEUROLOGY | Facility: CLINIC | Age: 62
End: 2017-06-28

## 2017-06-28 DIAGNOSIS — Z86.69 HISTORY OF MIGRAINE HEADACHES: Chronic | ICD-10-CM

## 2017-06-28 DIAGNOSIS — M50.30 DDD (DEGENERATIVE DISC DISEASE), CERVICAL: ICD-10-CM

## 2017-06-28 DIAGNOSIS — Z86.69 HISTORY OF HYDROCEPHALUS: ICD-10-CM

## 2017-06-28 PROCEDURE — 72141 MRI NECK SPINE W/O DYE: CPT | Mod: 26,,, | Performed by: RADIOLOGY

## 2017-06-28 PROCEDURE — 72141 MRI NECK SPINE W/O DYE: CPT | Mod: TC,PO

## 2017-06-28 RX ORDER — HYDROCODONE BITARTRATE AND ACETAMINOPHEN 7.5; 325 MG/1; MG/1
1 TABLET ORAL EVERY 12 HOURS PRN
Qty: 20 TABLET | Refills: 0 | Status: SHIPPED | OUTPATIENT
Start: 2017-06-28 | End: 2017-06-29 | Stop reason: SDUPTHER

## 2017-06-28 NOTE — TELEPHONE ENCOUNTER
----- Message from Palmira Lopez sent at 6/28/2017  3:36 PM CDT -----  Contact: pt [phone]  Patient called and asked if you will call her Narco she is in a lot of pain [facility] [address].

## 2017-06-28 NOTE — TELEPHONE ENCOUNTER
Informed patient that Dr. Heaton said the refill was too soon. Patient stated that she never got the first prescription that she wrote last month because Dr. Heaton cancelled it.     RX was cancelled due to her getting a rx from Dr. Pino the same day Dr. Heaton wrote hers.     Called the pharmacy. They stated that the last time the patient picked up Norco was on 05/24/17 for a 30 day supply written by Odette.

## 2017-06-28 NOTE — TELEPHONE ENCOUNTER
----- Message from Adriane Hernandez sent at 6/28/2017 10:02 AM CDT -----  Contact: self  Patient needs a norco prescription until surgery.  Please call back at [phone] (home)   [facility]   [address]   [phone]

## 2017-06-28 NOTE — TELEPHONE ENCOUNTER
----- Message from Apurva Lepe sent at 6/28/2017  1:46 PM CDT -----  Patient is requesting a return call regarding an update on her Norco prescription contact patient at [phone]      Thank you

## 2017-06-29 ENCOUNTER — TELEPHONE (OUTPATIENT)
Dept: NEUROLOGY | Facility: CLINIC | Age: 62
End: 2017-06-29

## 2017-06-29 NOTE — TELEPHONE ENCOUNTER
----- Message from Lisset Piedra sent at 6/29/2017  9:31 AM CDT -----  Patient is calling to speak to Jeanie concerning her prescription Norco that has still not arrived at:      [facility]  [address]  Ph# [phone]    Please call when completed at [phone].

## 2017-06-29 NOTE — TELEPHONE ENCOUNTER
Spoke with the pt, informed her the rx hydrocodone has been sent to pharmacy. Verbalized understanding.

## 2017-06-29 NOTE — TELEPHONE ENCOUNTER
Spoke with the pt, informed her the rx was sent to incorrect pharmacy, corrected and resent request to Dr. Heaton for approval. Instructed the pt to mable the pharmacy before she goes to .

## 2017-06-30 ENCOUNTER — PATIENT MESSAGE (OUTPATIENT)
Dept: NEUROLOGY | Facility: CLINIC | Age: 62
End: 2017-06-30

## 2017-06-30 ENCOUNTER — TELEPHONE (OUTPATIENT)
Dept: PAIN MEDICINE | Facility: CLINIC | Age: 62
End: 2017-06-30

## 2017-06-30 DIAGNOSIS — M50.30 DDD (DEGENERATIVE DISC DISEASE), CERVICAL: Primary | ICD-10-CM

## 2017-06-30 RX ORDER — HYDROCODONE BITARTRATE AND ACETAMINOPHEN 7.5; 325 MG/1; MG/1
1 TABLET ORAL EVERY 12 HOURS PRN
Qty: 20 TABLET | Refills: 0 | Status: SHIPPED | OUTPATIENT
Start: 2017-06-30 | End: 2018-09-26

## 2017-07-03 DIAGNOSIS — M54.12 CERVICAL RADICULOPATHY: Primary | ICD-10-CM

## 2017-07-03 RX ORDER — MIDAZOLAM HYDROCHLORIDE 5 MG/ML
4 INJECTION INTRAMUSCULAR; INTRAVENOUS ONCE
Status: CANCELLED | OUTPATIENT
Start: 2017-08-01

## 2017-07-03 RX ORDER — SODIUM CHLORIDE, SODIUM LACTATE, POTASSIUM CHLORIDE, CALCIUM CHLORIDE 600; 310; 30; 20 MG/100ML; MG/100ML; MG/100ML; MG/100ML
INJECTION, SOLUTION INTRAVENOUS CONTINUOUS
Status: CANCELLED | OUTPATIENT
Start: 2017-08-01

## 2017-07-10 ENCOUNTER — TELEPHONE (OUTPATIENT)
Dept: NEUROLOGY | Facility: CLINIC | Age: 62
End: 2017-07-10

## 2017-07-10 ENCOUNTER — TELEPHONE (OUTPATIENT)
Dept: PAIN MEDICINE | Facility: CLINIC | Age: 62
End: 2017-07-10

## 2017-07-10 NOTE — TELEPHONE ENCOUNTER
----- Message from Kp Loyd sent at 7/8/2017 12:02 PM CDT -----  Contact: Patient  Patient called to cancel injections procedure. thanks

## 2017-07-10 NOTE — TELEPHONE ENCOUNTER
----- Message from Kp Loyd sent at 7/8/2017 11:40 AM CDT -----  Contact: Patient  Patient called requesting to cancel botox appointment. Thanks,

## 2017-08-16 ENCOUNTER — TELEPHONE (OUTPATIENT)
Dept: PAIN MEDICINE | Facility: CLINIC | Age: 62
End: 2017-08-16

## 2017-08-16 NOTE — TELEPHONE ENCOUNTER
----- Message from Tanya Whitaker sent at 8/16/2017 10:26 AM CDT -----  Contact: self  Patient needs a fax sent to Dr Tapia's office [phone] regarding a letter of discharge. Dr Tapia's phone number is [phone]. Please call patient at [phone].thanks!

## 2017-08-17 NOTE — TELEPHONE ENCOUNTER
Returned call to the pt to question the requested letter of discharge as I do not understand fully the reason for the letter.

## 2017-08-17 NOTE — TELEPHONE ENCOUNTER
----- Message from Drew oSria sent at 8/17/2017 11:04 AM CDT -----  Contact: Maral  Maral said she just talked to office and they did not get letter. She states this is the 3rd time she called. Patient needs a fax sent to Dr Tapia's office [phone] regarding a letter of discharge. Dr Tapia's phone number is [phone]. Please call patient at [phone].thanks!

## 2017-08-18 ENCOUNTER — TELEPHONE (OUTPATIENT)
Dept: NEUROLOGY | Facility: CLINIC | Age: 62
End: 2017-08-18

## 2017-08-18 NOTE — TELEPHONE ENCOUNTER
Attempted to reach pt at number listed, received recording that Voice mail box has not been set up yet and messages can not be left.  Please see previous encounter from 08/16.  Another nurse attempted to reach pt to verify what type of letter is being requested.

## 2017-08-18 NOTE — TELEPHONE ENCOUNTER
----- Message from Frances Ponce sent at 8/18/2017 11:23 AM CDT -----  Contact: Patient  Patient called advising that she has been trying for three days to get records and a release a care sent from Dr. Heaton's office to her new doctor.  She advised that she moved and has transferred her care.  Please call patient back at [phone].  Thank you!

## 2017-08-18 NOTE — TELEPHONE ENCOUNTER
----- Message from Esther Aguirre sent at 8/18/2017  4:36 PM CDT -----  Contact: pt  Pt returning your call..[phone] (wjik)

## 2017-08-18 NOTE — TELEPHONE ENCOUNTER
----- Message from Negrita Martinez sent at 8/18/2017 10:40 AM CDT -----  Pt has requested a letter be faxed  # [phone] / state that she is no longer a patient of Dr torres

## 2017-08-18 NOTE — TELEPHONE ENCOUNTER
----- Message from Esther Aguirre sent at 8/18/2017  2:10 PM CDT -----  Contact: pt  Pt states for the last 3 days been trying to get a hold of someone int he office to fax a discharge from Dr Heaton so she can see another Dr Narayan..[phone]

## 2017-08-18 NOTE — TELEPHONE ENCOUNTER
----- Message from Shivani Wagner sent at 8/18/2017 10:33 AM CDT -----  Contact: [phone]  Please send fax to release her as a patient  Please call [phone]

## 2017-08-18 NOTE — TELEPHONE ENCOUNTER
Attempted to reach pt at [phone] several times, called back to back.  Pt does not answer phone, unable to Lm, recording states VM has not been set up. Called emergency contact Aura which is her sister and stated I have attempted to reach pt multiple times. She states that she will get a message to the pt that we have repeatedly attempted to reach her.

## 2017-08-18 NOTE — TELEPHONE ENCOUNTER
----- Message from Kathe Conti RT sent at 8/18/2017  4:01 PM CDT -----  Contact: pt    Called pod, pt , requesting to inform she have been waiting for several days for her records to be fax to  Office, please fax  asap to , pt is getting agitated, thanks.

## 2017-08-18 NOTE — TELEPHONE ENCOUNTER
Previously attempted to reach pt several times regarding the letter.   Dr. Heaton needs clarification.  Does this pt wish to be discharged from the practice.

## 2017-08-18 NOTE — TELEPHONE ENCOUNTER
Attempted to call pt numerous times to verify what type of letter is needed.  Dr. Heaton will need clarification.  Does the pt want to be discharged from the practice? Please see previous encounter.

## 2017-08-21 ENCOUNTER — TELEPHONE (OUTPATIENT)
Dept: NEUROLOGY | Facility: CLINIC | Age: 62
End: 2017-08-21

## 2017-08-21 NOTE — LETTER
August 21, 2017        No Recipients             [place] [facility]  [address] [facility] [address] [place] [address]  Phone: [phone]  Fax: [phone]   Patient: Maral Archibald   MR Number: [id number]   YOB: 1955   Date of Visit: 8/21/2017             Dear  Dear Dr. Tapia,            Mrs. Maral Archibald has been under my care from 02-02-16 to present for the treatment of Intractable chronic migraine without aura and without status migrainosus. She has asked that I write this letter releasing her from my care due to her recent relocation. If my office can offer any assistance, please do not hesitate to contact us at [phone].         Sincerely,    Patrick Heaton MD.  [facility]   Phone [phone]  Fax [phone]

## 2017-08-21 NOTE — TELEPHONE ENCOUNTER
Returned call to ask the pt what type of release letter she needs. If she is to see a new neurologist, we do not require the pt to be released from care per Dr. Heaton.

## 2017-08-21 NOTE — TELEPHONE ENCOUNTER
----- Message from Venice Schwarz sent at 8/21/2017  9:35 AM CDT -----  Contact: Patient  Maral, patient [phone], Patient recently moved and will need a release of care to her new doctor. Dr. Tapia fax [phone], so she may continue care closer to new home. Please advise. Thanks.    Appointment pending.

## 2017-08-21 NOTE — LETTER
August 21, 2017        No Recipients             [facility]  [address] [facility] [address] [place] [address]  Phone: [phone]  Fax: [phone]   Patient: Maral Archibald   MR Number: [id number]   YOB: 1955   Date of Visit: 8/21/2017     Dear Dr. Pino,          Mrs. Maral Archibald has been under my care from 02-02-16 to present for the treatment of Intractable chronic migraine without aura and without status migrainosus. She has asked that I write this letter releasing her from my care due to her recent relocation. If my office can offer any assistance, please do not hesitate to contact us at [phone].     Sincerely,    Patrick Haeton MD.  [facility]   Phone [phone]  Fax [phone]              CC  No Recipients    Enclosure

## 2017-08-21 NOTE — TELEPHONE ENCOUNTER
----- Message from Esther Aguirre sent at 8/17/2017  3:45 PM CDT -----  Contact: pt  Pt returning your call can be reached at ..[phone] (yspk)

## 2018-04-30 ENCOUNTER — TELEPHONE (OUTPATIENT)
Dept: RADIOLOGY | Facility: HOSPITAL | Age: 63
End: 2018-04-30

## 2018-05-04 DIAGNOSIS — Z79.899 HIGH RISK MEDICATION USE: Primary | ICD-10-CM

## 2018-05-04 DIAGNOSIS — I10 ESSENTIAL HYPERTENSION: ICD-10-CM

## 2018-05-04 DIAGNOSIS — K21.9 GASTROESOPHAGEAL REFLUX DISEASE, ESOPHAGITIS PRESENCE NOT SPECIFIED: ICD-10-CM

## 2018-05-04 RX ORDER — LISINOPRIL 20 MG/1
TABLET ORAL
Qty: 180 TABLET | Refills: 3 | Status: SHIPPED | OUTPATIENT
Start: 2018-05-04 | End: 2019-05-05 | Stop reason: SDUPTHER

## 2018-05-06 RX ORDER — OMEPRAZOLE 20 MG/1
CAPSULE, DELAYED RELEASE ORAL
Qty: 90 CAPSULE | Refills: 3 | Status: SHIPPED | OUTPATIENT
Start: 2018-05-06

## 2018-05-06 RX ORDER — FELODIPINE 10 MG/1
TABLET, EXTENDED RELEASE ORAL
Qty: 90 TABLET | Refills: 3 | Status: SHIPPED | OUTPATIENT
Start: 2018-05-06

## 2018-05-06 RX ORDER — HYDROCHLOROTHIAZIDE 12.5 MG/1
CAPSULE ORAL
Qty: 90 CAPSULE | Refills: 3 | Status: SHIPPED | OUTPATIENT
Start: 2018-05-06

## 2018-05-06 NOTE — TELEPHONE ENCOUNTER
Medications refilled.  Labs due.  Orders in.  Please schedule.    I am not sure where she is living or if she is seeing another PCP.  Please call her.    ZAHIDA

## 2018-08-21 ENCOUNTER — TELEPHONE (OUTPATIENT)
Dept: DERMATOLOGY | Facility: CLINIC | Age: 63
End: 2018-08-21

## 2018-08-24 ENCOUNTER — TELEPHONE (OUTPATIENT)
Dept: DERMATOLOGY | Facility: CLINIC | Age: 63
End: 2018-08-24

## 2018-09-26 ENCOUNTER — TELEPHONE (OUTPATIENT)
Dept: DERMATOLOGY | Facility: CLINIC | Age: 63
End: 2018-09-26

## 2018-09-26 ENCOUNTER — OFFICE VISIT (OUTPATIENT)
Dept: OTOLARYNGOLOGY | Facility: CLINIC | Age: 63
End: 2018-09-26
Payer: MEDICARE

## 2018-09-26 ENCOUNTER — INITIAL CONSULT (OUTPATIENT)
Dept: DERMATOLOGY | Facility: CLINIC | Age: 63
End: 2018-09-26
Payer: MEDICARE

## 2018-09-26 VITALS
HEIGHT: 64 IN | HEIGHT: 64 IN | BODY MASS INDEX: 29.88 KG/M2 | BODY MASS INDEX: 29.89 KG/M2 | SYSTOLIC BLOOD PRESSURE: 172 MMHG | WEIGHT: 175.06 LBS | HEART RATE: 85 BPM | WEIGHT: 175 LBS | DIASTOLIC BLOOD PRESSURE: 91 MMHG

## 2018-09-26 DIAGNOSIS — B18.2 CHRONIC HEPATITIS C WITHOUT HEPATIC COMA: ICD-10-CM

## 2018-09-26 DIAGNOSIS — C44.311 BASAL CELL CARCINOMA (BCC) OF LEFT ALA NASI: Primary | ICD-10-CM

## 2018-09-26 DIAGNOSIS — C44.311 BASAL CELL CARCINOMA OF NOSE: Primary | ICD-10-CM

## 2018-09-26 PROCEDURE — 99999 PR PBB SHADOW E&M-EST. PATIENT-LVL III: CPT | Mod: PBBFAC,,, | Performed by: DERMATOLOGY

## 2018-09-26 PROCEDURE — 99202 OFFICE O/P NEW SF 15 MIN: CPT | Mod: S$PBB,,, | Performed by: DERMATOLOGY

## 2018-09-26 PROCEDURE — 99999 PR PBB SHADOW E&M-EST. PATIENT-LVL III: CPT | Mod: PBBFAC,,, | Performed by: OTOLARYNGOLOGY

## 2018-09-26 PROCEDURE — 99214 OFFICE O/P EST MOD 30 MIN: CPT | Mod: S$PBB,,, | Performed by: OTOLARYNGOLOGY

## 2018-09-26 PROCEDURE — 3080F DIAST BP >= 90 MM HG: CPT | Mod: CPTII,,, | Performed by: DERMATOLOGY

## 2018-09-26 PROCEDURE — 3008F BODY MASS INDEX DOCD: CPT | Mod: CPTII,,, | Performed by: OTOLARYNGOLOGY

## 2018-09-26 PROCEDURE — 99213 OFFICE O/P EST LOW 20 MIN: CPT | Mod: PBBFAC,27,PO | Performed by: DERMATOLOGY

## 2018-09-26 PROCEDURE — 3008F BODY MASS INDEX DOCD: CPT | Mod: CPTII,,, | Performed by: DERMATOLOGY

## 2018-09-26 PROCEDURE — 99213 OFFICE O/P EST LOW 20 MIN: CPT | Mod: PBBFAC,PO | Performed by: OTOLARYNGOLOGY

## 2018-09-26 PROCEDURE — 3077F SYST BP >= 140 MM HG: CPT | Mod: CPTII,,, | Performed by: DERMATOLOGY

## 2018-09-26 NOTE — LETTER
September 26, 2018      Bernabe Ayala MD  [address]  [facility] [address]           [place] [facility]  [address] [facility] [address] [place] [address]  Phone: [phone]  Fax: [phone]          Patient: Maral Archibald   MR Number: [id number]   YOB: 1955   Date of Visit: 9/26/2018       Dear Dr. Bernabe Ayala:    Thank you for referring Maral Archibald to me for evaluation. Attached you will find relevant portions of my assessment and plan of care.    If you have questions, please do not hesitate to call me. I look forward to following Maral Archibald along with you.    Sincerely,    Jake Vyas MD    Enclosure  CC:  No Recipients    If you would like to receive this communication electronically, please contact [email or web address] or [phone] to request more information on [employer] Link access.    For providers and/or their staff who would like to refer a patient to [facility], please contact us through our one-stop-shop provider referral line, [facility], at [phone].    If you feel you have received this communication in error or would no longer like to receive these types of communications, please e-mail [email or web address]

## 2018-09-26 NOTE — PROGRESS NOTES
Subjective:       Patient ID: Maral Archibald is a 63 y.o. female.    Chief Complaint: BCC L nasal ala    Maral is here for evaluation of nodular infiltrative BCC of left nasal ala. The lesion has been present for months. no pain, no bleeding, norapid growth in a short time. There is no history of previous skin cancer. no cardiac issues, no anti-coagulants, no anesthesia issues. She does not use tobacco.    Social History     Tobacco Use   Smoking Status Former Smoker    Types: Cigarettes    Last attempt to quit: 1984    Years since quittin.1   Smokeless Tobacco Never Used     Social History     Substance and Sexual Activity   Alcohol Use No          Review of Systems   Constitutional: Negative for activity change and appetite change.   Eyes: Negative for discharge.   Respiratory: Negative for difficulty breathing and wheezing   Cardiovascular: Negative for chest pain.   Gastrointestinal: Negative for abdominal distention and abdominal pain.   Endocrine: Negative for cold intolerance and heat intolerance.   Genitourinary: Negative for dysuria.   Musculoskeletal: Negative for gait problem and joint swelling.   Skin: Negative for color change and pallor.   Neurological: Negative for syncope and weakness.   Psychiatric/Behavioral: Negative for agitation and confusion.     Objective:        Constitutional:   She is oriented to person, place, and time. She appears well-developed and well-nourished. She appears alert. She is active. Normal speech.      Head:  Normocephalic and atraumatic. Head is without TMJ tenderness. No scars. Salivary glands normal.  Facial strength is normal.      Ears:    Right Ear: No drainage or swelling. No middle ear effusion.   Left Ear: No drainage or swelling.  No middle ear effusion.     Nose:  No mucosal edema, rhinorrhea or sinus tenderness. No turbinate hypertrophy.        Well healed right forehead incision from crani extending into right/mid forehead  region    Mouth/Throat  Oropharynx clear and moist without lesions or asymmetry, normal uvula midline and mirror exam normal. Normal dentition. No uvula swelling, lacerations or trismus. No oropharyngeal exudate. Tonsillar erythema, tonsillar exudate.      Neck:  Full range of motion with neck supple and no adenopathy. Thyroid tenderness is present. No tracheal deviation, no edema, no erythema, normal range of motion, no stridor, no crepitus and no neck rigidity present. No thyroid mass present.     Cardiovascular:   Intact distal pulses and normal pulses.      Pulmonary/Chest:   Effort normal and breath sounds normal. No stridor.     Psychiatric:   Her speech is normal and behavior is normal. Her mood appears not anxious. Her affect is not labile.     Neurological:   She is alert and oriented to person, place, and time. No sensory deficit.     Skin:   No abrasions, lacerations, lesions, or rashes. No abrasion and no bruising noted.         Tests / Results:  None    Assessment:       1. Basal cell carcinoma (BCC) of left ala nasi    2. Chronic hepatitis C without hepatic coma          Plan:         Maral Archibald is scheduled to undergo Moh's surgery for excision of the lesion. Dr. Ayala will perform the resection, and I will be available for reconstruction of the defect. I discussed my tentative plan with the patient including FTSG vs. melolabial flap (2 staged), discussed possible need for cartilage depending on defect. I discussed that this may change based on the ultimate defect. I discussed the reconstructive ladder including healing by secondary intention all the way to interpolated flaps, if necessary.     We will plan on reconstruction under MAC.    Discussed risks of surgery including failure, scar, need for further procedures, bleeding, infection

## 2018-09-26 NOTE — LETTER
September 26, 2018      Sarah Carr MD  [address] [place] [address]           [place] [facility] [address] [facility] [address] [place] [address]  Phone: [phone]          Patient: Maral Archibald   MR Number: [id number]   YOB: 1955   Date of Visit: 9/26/2018       Dear Dr. Sarah Carr:    Thank you for referring Maral Archibald to me for evaluation. Attached you will find relevant portions of my assessment and plan of care.    If you have questions, please do not hesitate to call me. I look forward to following Maral Archibald along with you.    Sincerely,    Bernabe Ayala MD    Enclosure  CC:  No Recipients    If you would like to receive this communication electronically, please contact [email or web address] or [phone] to request more information on [employer] Link access.    For providers and/or their staff who would like to refer a patient to [facility], please contact us through our one-stop-shop provider referral line, [facility], at [phone].    If you feel you have received this communication in error or would no longer like to receive these types of communications, please e-mail [email or web address]

## 2018-09-26 NOTE — PROGRESS NOTES
ALLERGIES:  Sulfa (sulfonamide antibiotics)    CHIEF COMPLAINT:  This 63 y.o. female comes for evaluation for Mohs' Micrographic Surgery, Fresh Tissue Technique, for treatment of a biopsy-proven basal cell carcinoma on the left nasal ala. Consultation requested by Berry Carr M.D..    HISTORY OF PRESENT ILLNESS:   Location: left nasal ala  Duration: 1year or more  Quality: persistent  Context: status post biopsy by Sarah Carr M.D.; path = basal cell carcinoma; pathology specimen # [id number], [facility]     Prior Treatment: none  See also the handwritten notes/diagrams scanned to chart for additional details.    Defibrillator: No  Pacemaker: No  Artificial heart valves: No  Artificial joints: No    REVIEW OF SYSTEMS:   General: general health good  Skin: has no previous history of skin cancer(s)  CV: has hypertension, no artificial valves, has no chest pain  Resp: has no shortness of breath  Endo: has no diabetes  Hem/Lymph: not taking prescribed anticoagulants, has easy bruising/bleeding  Allergy/Immuno: has allergies as noted above  GI: has history of hepatitis C; treated  Neuro: CT shunt; cerebral aneurysm history; stroke; bipolar disorder  MS: as noted above     PAST MEDICAL HISTORY:  Past Medical History:   Diagnosis Date    Allergy     Bipolar disorder     type I    Closed head injury     Glaucoma     Headache(784.0)     Hepatitis C     Hydrocephalus     Hypertension     Nuclear sclerosis of both eyes 2016    Seizures     Stroke     aneurysm, cerebral    Trouble in sleeping        PAST SURGICAL HISTORY:  Past Surgical History:   Procedure Laterality Date    ADENOIDECTOMY      CEREBRAL ANEURYSM REPAIR       SECTION, CLASSIC      CHOLECYSTECTOMY      COLONOSCOPY N/A 2014    [facility]    ESOPHAGOGASTRODUODENOSCOPY (EGD) N/A 2017    [facility]    LASIK      OU x 10 yrs    SHUNT  REMOVAL Left     TONSILLECTOMY      TONSILLECTOMY, ADENOIDECTOMY, BILATERAL MYRINGOTOMY AND TUBES      TOTAL ABDOMINAL HYSTERECTOMY      ULTRASOUND-ENDOSCOPIC-UPPER Left 2017    [facility] at [facility]        SOCIAL HISTORY:  Dependencies: smoking status as noted below  Social History     Tobacco Use    Smoking status: Former Smoker     Types: Cigarettes     Last attempt to quit: 1984     Years since quittin.1    Smokeless tobacco: Never Used   Substance Use Topics    Alcohol use: No    Drug use: No       PERTINENT MEDICATIONS:  See medications list.    Current Outpatient Medications:     felodipine (PLENDIL) 10 MG 24 hr tablet, TAKE 1 TABLET EVERY DAY, Disp: 90 tablet, Rfl: 3    hydroCHLOROthiazide (MICROZIDE) 12.5 mg capsule, TAKE 1 CAPSULE EVERY DAY, Disp: 90 capsule, Rfl: 3    lamotrigine (LAMICTAL) 200 MG tablet, Take 1 tablet (200 mg total) by mouth once daily., Disp: 90 tablet, Rfl: 3    latanoprost 0.005 % ophthalmic solution, INSTILL 1 DROP INTO BOTH EYES EVERY EVENING, Disp: 1 Bottle, Rfl: 1    lisinopril (PRINIVIL,ZESTRIL) 20 MG tablet, TAKE 2 TABLETS ONE TIME DAILY, Disp: 180 tablet, Rfl: 3    melatonin 5 mg Cap, Take 5 mg by mouth every evening. , Disp: , Rfl:     multivitamin (ONE DAILY MULTIVITAMIN) per tablet, Take 1 tablet by mouth once daily., Disp: , Rfl:     omeprazole (PRILOSEC) 20 MG capsule, TAKE 1 CAPSULE ONE TIME DAILY, Disp: 90 capsule, Rfl: 3    polyethylene glycol (GLYCOLAX) 17 gram/dose powder, Take 17 g by mouth daily as needed., Disp: 1530 g, Rfl: 3    promethazine (PHENERGAN) 25 MG tablet, TAKE 1 TABLET (25 MG TOTAL) BY MOUTH EVERY 6 (SIX) HOURS AS NEEDED FOR NAUSEA., Disp: 30 tablet, Rfl: 1    RESTASIS 0.05 % ophthalmic emulsion, INSTILL 1 DROP INTO BOTH EYES TWICE DAILY, Disp: 180 vial, Rfl: 2    venlafaxine (EFFEXOR-XR) 150 MG Cp24, Take 1 capsule (150 mg total) by mouth every morning., Disp: 90 capsule, Rfl:  0    ALLERGIES:  Sulfa (sulfonamide antibiotics)    EXAM:  See also the handwritten notes/diagrams scanned to chart for additional details.  Constitutional  General appearance: well-developed, well-nourished, well-kempt older white female    Eyes  Inspection of conjunctivae and lids reveals no abnormalities; sclerae anicteric  Neurologic/Psychiatric  Alert,  normal orientation to time, place, person  Normal mood and affect with no evidence of depression, anxiety, agitation  Skin: see photo(s)  Head: background moderate solar damage to exposed areas of skin; in addition, inspection/palpation reveals an approximately 6-7 mm pearly nodule on the left nasal ala which feels freely movable over the underlying tissues on palpation;  she confirmed this as the site of the prior biopsy  Neck: examination reveals moderate chronic solar damage  Right upper extremity: examination reveals moderate chronic solar damage; some ecchymosis/ecchymoses   Left upper extremity: examination reveals moderate chronic solar damage; some ecchymosis/ecchymoses           ASSESSMENT: biopsy-proven basal cell carcinoma of the left nasal ala  chronic solar damage to areas as noted above    PLAN:  The diagnosis and management options, and risks and benefits of the alternatives, including observation/non-treatment, radiation treatment, excision with vertical frozen section or paraffin-embedded section margin evaluation, and Mohs' Micrographic Surgery, Fresh Tissue Technique, were discussed at length with the patient. In particular, the discussion included, but was not limited to, the following:    One alternative at this point would be to defer further treatment and observe the lesion. With small skin cancers of this kind, it is possible that a biopsy can be sufficient to definitively treat a small skin cancer of this kind. Alternatively, some skin cancers are slow growing and do not require immediate treatment. The potential advantage of this choice  would be to avoid the need for possibly unnecessary additional surgery. Among the potential disadvantages of this would be the possibility of enlargement of the lesion, more extensive spread of the lesion or recurrence at a later date, which might necessitate a larger and more complex surgery.    Radiation treatment can be an effective treatment for this type of skin cancer. The usual course of treatment is every weekday for several weeks. Local irritation will result from treatment, although no systemic side effects are expected. The potential advantage of radiation treatment is that it avoids the need for surgery. Among the disadvantages of radiation treatment are the length of treatment, the local inflammatory response, the absence of pathologic confirmation of the removal of the skin cancer, a possible increased risk of additional skin cancer in the treated area in later years, and a somewhat increased risk of recurrence at a later date.     Excisional surgery can be an effective treatment for this type of skin cancer. This would involve excision of the lesion with margin evaluation by submitting the specimen to a pathologist for either immediate marginal assessment via frozen section processing, or delayed marginal assessment by fixed-tissue processing. The potential advantage of this technique is that it offers a way of treating the lesion with some degree of histologic confirmation of tumor removal. Among the disadvantages of this treatment are the possible need for re-excision if marginal involvement is identified, a somewhat greater likelihood of recurrence as compared to Mohs' surgery because of the less comprehensive margin evaluation inherent in the technique, and the general potential risks of surgery, including allergic reactions to the anesthetic and other materials used, infection, injury to nerves in the area with consequent loss of sensation or muscle function, and scarring or distortion of  surrounding structures.    Mohs' surgery is a very effective treatment for this type of skin cancer. The potential advantage of Mohs' surgery is that this technique offers the greatest possible certainty of knowing that the skin cancer has been completely removed, with the removal of the least amount of normal tissue. The potential disadvantages of Mohs' surgery include the duration of the surgery, the possible need for a separate surgery for reconstruction following tumor removal, and scarring as a result. In addition, general potential risks of surgery as noted above also apply to treatment via Mohs' surgery.    In light of the nature of this tumor and the location on the nose in an area of increased risk of recurrence,  Mohs' micrographic surgery was thought to be the most appropriate management choice, and this diagnosis is appropriate for treatment by Mohs' micrographic surgery.     We also discussed options for repair of the site to follow tumor removal via Mohs' surgery, including the participation of a plastic surgeon to reconstruct the resultant wound. Given the location, the anticipated size and potential complexity of the defect to follow tumor removal via Mohs' surgery, reconstruction will be coordinated with Dr. Jake Vyas.  I have arranged for her to be seen in consultation by Dr. Vyas today; we will coordinate her surgeries thereafter.    Sufficient time was available for questions, and all questions were answered to her satisfaction. She fully understands the aims, risks, alternatives, and possible complications, and has elected to proceed with the surgery, and verbally consented to do so. The procedure will be scheduled in the near future.    Routine pre-op instructions were given to her.    --------------------------------------  Note: Some or all of this note may have been generated using voice recognition software. There may be voice recognition errors including grammatical and/or spelling  errors found in the text. Attempts were made to correct these errors prior to signature.

## 2018-11-12 ENCOUNTER — TELEPHONE (OUTPATIENT)
Dept: DERMATOLOGY | Facility: CLINIC | Age: 63
End: 2018-11-12

## 2018-11-12 NOTE — TELEPHONE ENCOUNTER
----- Message from Nellie Almodovar sent at 11/12/2018  2:41 PM CST -----  Type: Needs Medical Advice    Who Called:  Patient    Best Call Back Number: [phone] (home)     Additional Information: Patient called to cancel her appt on 12/5/18

## 2018-12-05 ENCOUNTER — TELEPHONE (OUTPATIENT)
Dept: DERMATOLOGY | Facility: CLINIC | Age: 63
End: 2018-12-05

## 2018-12-05 NOTE — TELEPHONE ENCOUNTER
Called patient to see if she was on her way for surgery, she stated she forgot about it and it would take two hours for her to get here.So we cancelled her surgery and we are going to reschedule her. Dayami

## 2018-12-17 ENCOUNTER — TELEPHONE (OUTPATIENT)
Dept: NEUROSURGERY | Facility: CLINIC | Age: 63
End: 2018-12-17

## 2018-12-17 DIAGNOSIS — I67.1 CEREBRAL ANEURYSM: ICD-10-CM

## 2018-12-17 DIAGNOSIS — Z98.2 VP (VENTRICULOPERITONEAL) SHUNT STATUS: Primary | ICD-10-CM

## 2018-12-17 NOTE — TELEPHONE ENCOUNTER
----- Message from Yobani Sanches sent at 12/17/2018  8:11 AM CST -----  Contact: same  Patient called in and stated she last saw Dr. Patel in 2016 and was told she did not have enough fluid on the brain.  Patient stated she is starting to have the same symptoms she did before she was diagnosed with this. Patient would like a call back from office to see what can be done.  Call back number is [phone]

## 2018-12-18 ENCOUNTER — TELEPHONE (OUTPATIENT)
Dept: NEUROSURGERY | Facility: CLINIC | Age: 63
End: 2018-12-18

## 2018-12-18 NOTE — TELEPHONE ENCOUNTER
----- Message from Shira Powell sent at 12/18/2018  8:05 AM CST -----  Contact: pt  Pt would like to be called back regarding about fluid on her brain    Pt can be reached at [phone]

## 2018-12-18 NOTE — TELEPHONE ENCOUNTER
Spoke with pt and booked all imaging and f/u with Patsy; date/times/locations confirmed; verbalized understanding.

## 2019-01-03 DIAGNOSIS — I67.1 CEREBRAL ANEURYSM: Primary | ICD-10-CM

## 2019-02-01 ENCOUNTER — TELEPHONE (OUTPATIENT)
Dept: DERMATOLOGY | Facility: CLINIC | Age: 64
End: 2019-02-01

## 2019-02-01 ENCOUNTER — TELEPHONE (OUTPATIENT)
Dept: NEUROSURGERY | Facility: CLINIC | Age: 64
End: 2019-02-01

## 2019-02-01 NOTE — TELEPHONE ENCOUNTER
Patient has some questioning about required imaging for her appointment on 2.13.2019 and imaging she already obtained.

## 2019-02-01 NOTE — TELEPHONE ENCOUNTER
----- Message from Ana Hair sent at 2/1/2019  9:06 AM CST -----  Contact: self   Patient want to speak with a nurse regarding rescheduling appointment please call back at [phone] (home)

## 2019-02-01 NOTE — TELEPHONE ENCOUNTER
----- Message from Ana Hair sent at 2/1/2019  9:06 AM CST -----  Contact: self   Patient want to speak with a nurse regarding rescheduling appointment please call back at [phone] (home)

## 2019-02-08 ENCOUNTER — TELEPHONE (OUTPATIENT)
Dept: DERMATOLOGY | Facility: CLINIC | Age: 64
End: 2019-02-08

## 2019-02-13 ENCOUNTER — HOSPITAL ENCOUNTER (OUTPATIENT)
Dept: RADIOLOGY | Facility: HOSPITAL | Age: 64
Discharge: HOME | End: 2019-02-13
Attending: PHYSICIAN ASSISTANT
Payer: MEDICARE

## 2019-02-13 ENCOUNTER — TELEPHONE (OUTPATIENT)
Dept: DERMATOLOGY | Facility: CLINIC | Age: 64
End: 2019-02-13

## 2019-02-13 DIAGNOSIS — Z98.2 VP (VENTRICULOPERITONEAL) SHUNT STATUS: ICD-10-CM

## 2019-02-13 PROCEDURE — 72020 XR SHUNT SERIES: ICD-10-PCS | Mod: 26,,, | Performed by: RADIOLOGY

## 2019-02-13 PROCEDURE — 71045 X-RAY EXAM CHEST 1 VIEW: CPT | Mod: TC,FY,PO

## 2019-02-13 PROCEDURE — 74018 XR SHUNT SERIES: ICD-10-PCS | Mod: 26,,, | Performed by: RADIOLOGY

## 2019-02-13 PROCEDURE — 71045 XR SHUNT SERIES: ICD-10-PCS | Mod: 26,,, | Performed by: RADIOLOGY

## 2019-02-13 PROCEDURE — 70250 XR SHUNT SERIES: ICD-10-PCS | Mod: 26,,, | Performed by: RADIOLOGY

## 2019-02-13 PROCEDURE — 74018 RADEX ABDOMEN 1 VIEW: CPT | Mod: 26,,, | Performed by: RADIOLOGY

## 2019-02-13 PROCEDURE — 74018 RADEX ABDOMEN 1 VIEW: CPT | Mod: TC,FY,PO

## 2019-02-13 PROCEDURE — 72020 X-RAY EXAM OF SPINE 1 VIEW: CPT | Mod: 26,,, | Performed by: RADIOLOGY

## 2019-02-13 PROCEDURE — 76000 FLUOROSCOPY <1 HR PHYS/QHP: CPT | Mod: 26,,, | Performed by: RADIOLOGY

## 2019-02-13 PROCEDURE — 70250 X-RAY EXAM OF SKULL: CPT | Mod: 26,,, | Performed by: RADIOLOGY

## 2019-02-13 PROCEDURE — 71045 X-RAY EXAM CHEST 1 VIEW: CPT | Mod: 26,,, | Performed by: RADIOLOGY

## 2019-02-13 PROCEDURE — 76000 FLUOROSCOPY <1 HR PHYS/QHP: CPT | Mod: TC,PO

## 2019-02-13 PROCEDURE — 76000 FL SHUNT SETTING: ICD-10-PCS | Mod: 26,,, | Performed by: RADIOLOGY

## 2019-02-13 NOTE — TELEPHONE ENCOUNTER
Called patient several times this morning to see why she didn't show for her surgery with Dr. Ayala, but I could not leave a message for her to call me back.

## 2019-02-14 ENCOUNTER — ANESTHESIA EVENT (OUTPATIENT)
Dept: SURGERY | Facility: HOSPITAL | Age: 64
End: 2019-02-14

## 2019-02-15 ENCOUNTER — ANESTHESIA (OUTPATIENT)
Dept: SURGERY | Facility: HOSPITAL | Age: 64
End: 2019-02-15

## 2019-02-20 ENCOUNTER — OFFICE VISIT (OUTPATIENT)
Dept: NEUROSURGERY | Facility: CLINIC | Age: 64
End: 2019-02-20
Payer: MEDICARE

## 2019-02-20 VITALS
SYSTOLIC BLOOD PRESSURE: 132 MMHG | DIASTOLIC BLOOD PRESSURE: 78 MMHG | WEIGHT: 170.44 LBS | BODY MASS INDEX: 30.2 KG/M2 | HEIGHT: 63 IN | HEART RATE: 80 BPM | TEMPERATURE: 99 F

## 2019-02-20 DIAGNOSIS — Z98.2 VP (VENTRICULOPERITONEAL) SHUNT STATUS: Primary | ICD-10-CM

## 2019-02-20 DIAGNOSIS — R51.9 CHRONIC INTRACTABLE HEADACHE, UNSPECIFIED HEADACHE TYPE: ICD-10-CM

## 2019-02-20 DIAGNOSIS — G89.29 CHRONIC INTRACTABLE HEADACHE, UNSPECIFIED HEADACHE TYPE: ICD-10-CM

## 2019-02-20 PROCEDURE — 99204 OFFICE O/P NEW MOD 45 MIN: CPT | Mod: S$GLB,,, | Performed by: PHYSICIAN ASSISTANT

## 2019-02-20 PROCEDURE — 99999 PR PBB SHADOW E&M-EST. PATIENT-LVL III: CPT | Mod: PBBFAC,,, | Performed by: PHYSICIAN ASSISTANT

## 2019-02-20 PROCEDURE — 3078F DIAST BP <80 MM HG: CPT | Mod: CPTII,S$GLB,, | Performed by: PHYSICIAN ASSISTANT

## 2019-02-20 PROCEDURE — 3008F PR BODY MASS INDEX (BMI) DOCUMENTED: ICD-10-PCS | Mod: CPTII,S$GLB,, | Performed by: PHYSICIAN ASSISTANT

## 2019-02-20 PROCEDURE — 99204 PR OFFICE/OUTPT VISIT, NEW, LEVL IV, 45-59 MIN: ICD-10-PCS | Mod: S$GLB,,, | Performed by: PHYSICIAN ASSISTANT

## 2019-02-20 PROCEDURE — 3078F PR MOST RECENT DIASTOLIC BLOOD PRESSURE < 80 MM HG: ICD-10-PCS | Mod: CPTII,S$GLB,, | Performed by: PHYSICIAN ASSISTANT

## 2019-02-20 PROCEDURE — 3008F BODY MASS INDEX DOCD: CPT | Mod: CPTII,S$GLB,, | Performed by: PHYSICIAN ASSISTANT

## 2019-02-20 PROCEDURE — 3075F SYST BP GE 130 - 139MM HG: CPT | Mod: CPTII,S$GLB,, | Performed by: PHYSICIAN ASSISTANT

## 2019-02-20 PROCEDURE — 3075F PR MOST RECENT SYSTOLIC BLOOD PRESS GE 130-139MM HG: ICD-10-PCS | Mod: CPTII,S$GLB,, | Performed by: PHYSICIAN ASSISTANT

## 2019-02-20 PROCEDURE — 99999 PR PBB SHADOW E&M-EST. PATIENT-LVL III: ICD-10-PCS | Mod: PBBFAC,,, | Performed by: PHYSICIAN ASSISTANT

## 2019-02-25 ENCOUNTER — TELEPHONE (OUTPATIENT)
Dept: OTOLARYNGOLOGY | Facility: CLINIC | Age: 64
End: 2019-02-25

## 2019-02-25 ENCOUNTER — TELEPHONE (OUTPATIENT)
Dept: DERMATOLOGY | Facility: CLINIC | Age: 64
End: 2019-02-25

## 2019-02-25 NOTE — TELEPHONE ENCOUNTER
Dayami at Dr Ayala called  patient to see why she never showed for her surgery appointment, she said that she had no transportation, but she is seeing a Dr. Travis in [place] on 2-27-19 to have it removed and a repair the following week.

## 2019-02-25 NOTE — TELEPHONE ENCOUNTER
Called patient to see why she never showed for her surgery appointment, she said that she had no transportation, but she is see a Dr. Travis on 2-27-19 to have it removed and a repair the following week. Dayami

## 2019-03-07 NOTE — PROGRESS NOTES
Neurosurgery History & Physical    Patient ID: Maral Archibald is a 63 y.o. female.    Chief Complaint   Patient presents with    Follow-up       Review of Systems   Constitutional: Negative for activity change, chills, fever and unexpected weight change.   HENT: Negative for hearing loss, tinnitus, trouble swallowing and voice change.    Eyes: Negative.  Negative for visual disturbance.   Respiratory: Negative for apnea, chest tightness and shortness of breath.    Cardiovascular: Negative.  Negative for chest pain and palpitations.   Gastrointestinal: Negative.  Negative for abdominal pain, constipation, diarrhea, nausea and vomiting.   Genitourinary: Negative.  Negative for difficulty urinating, dysuria and frequency.   Musculoskeletal: Negative for back pain, gait problem, myalgias, neck pain and neck stiffness.   Skin: Negative for wound.   Allergic/Immunologic: Negative for immunocompromised state.   Neurological: Positive for headaches. Negative for dizziness, tremors, seizures, facial asymmetry, speech difficulty, weakness, light-headedness and numbness.   Psychiatric/Behavioral: Negative for confusion and decreased concentration.       Past Medical History:   Diagnosis Date    Allergy     Bipolar disorder     type I    Closed head injury     Glaucoma     Headache(784.0)     Hepatitis C     Hydrocephalus     Hypertension     Nuclear sclerosis of both eyes 2016    Seizures     Stroke     aneurysm, cerebral    Trouble in sleeping      Past Surgical History:   Procedure Laterality Date    ADENOIDECTOMY      CEREBRAL ANEURYSM REPAIR       SECTION, CLASSIC      CHOLECYSTECTOMY      COLONOSCOPY N/A 2014    [facility]    ESOPHAGOGASTRODUODENOSCOPY (EGD) N/A 2017    [facility]    LASIK      OU x 10 yrs    SHUNT REMOVAL Left     TONSILLECTOMY      TONSILLECTOMY, ADENOIDECTOMY, BILATERAL  MYRINGOTOMY AND TUBES      TOTAL ABDOMINAL HYSTERECTOMY      ULTRASOUND-ENDOSCOPIC-UPPER Left 2017    [facility] at [facility]     Social History     Socioeconomic History    Marital status:      Spouse name: Not on file    Number of children: 0    Years of education: Not on file    Highest education level: Not on file   Social Needs    Financial resource strain: Not on file    Food insecurity - worry: Not on file    Food insecurity - inability: Not on file    Transportation needs - medical: Not on file    Transportation needs - non-medical: Not on file   Occupational History    Not on file   Tobacco Use    Smoking status: Former Smoker     Types: Cigarettes     Last attempt to quit: 1984     Years since quittin.5    Smokeless tobacco: Never Used   Substance and Sexual Activity    Alcohol use: No    Drug use: No    Sexual activity: Not Currently   Other Topics Concern    Caffeine Use Not Asked    Financial Status: Disabled Not Asked    Legal: Involved in criminal litigation Not Asked    Caffeine Use: Frequent Not Asked    Financial Status: Employed Not Asked    Legal: Other Not Asked    Caffeine Use: Moderate Not Asked    Financial Status: Unemployed Not Asked    Leisure: Exercise Not Asked    Caffeine Use: Substantial Not Asked    Financial Status: Other Not Asked    Leisure: Fishing Not Asked    Childhood History: Adopted Not Asked    Firearms: Does patient have access to a firearm? Not Asked    Leisure: Hunting Not Asked    Childhood History: Early trauma Not Asked    Home situation: homeless Not Asked    Leisure: Movie Watching Not Asked    Childhood History: Raised by parents Not Asked    Home situation: lives alone Not Asked    Leisure: Shopping Not Asked    Childhood History: Uneventful Not Asked    Home situation: lives in group home Not Asked    Leisure: Sports Not Asked    Childhood History: Other Not Asked    Home  situation: lives in nursing home Not Asked    Leisure: Time with family Not Asked    Education: Unfinished High School Not Asked    Home situation: lives in shelter Not Asked     Service Not Asked    Education: High School Graduate Not Asked    Home situation: lives with family Not Asked    Spirituality: Active Participation Not Asked    Education: Unfinished college Not Asked    Home situation: lives with friends Not Asked    Spirituality: Organized Mandaeism Not Asked    Education: Trade School Not Asked    Home situation: lives with significant other Not Asked    Spirituality: Private Participation Not Asked    Education: Associate's Degree Not Asked    Home situation: lives with spouse Not Asked    Patient feels they ought to cut down on drinking/drug use Not Asked    Education: Bachelor's Degree Not Asked    Legal consequences of chemical use Not Asked    Patient annoyed by others criticizing their drinking/drug use Not Asked    Education: More than one Bachelor's or Professional Not Asked    Legal: Arrest history Not Asked    Patient has felt bad or guilty about drinking/drug use Not Asked    Education: Master's, PhD Not Asked    Legal: Involved in civil litigation Not Asked    Patient has had a drink/used drugs as an eye opener in the AM Not Asked   Social History Narrative    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  Maral is able to drive.  She does not work outside of her home.      Family History   Problem Relation Age of Onset    Cancer Father     Aneurysm Father         Behind left eye    Blindness Father     Nephrolithiasis Father     Cancer Maternal Grandfather     Cancer Paternal Grandfather     Glaucoma Mother     Aneurysm Sister     Nephrolithiasis Sister     Amblyopia Neg Hx     Cataracts Neg Hx     Diabetes Neg Hx     Hypertension Neg Hx     Macular degeneration Neg Hx     Retinal detachment Neg Hx     Strabismus Neg Hx     Stroke Neg Hx   "   Thyroid disease Neg Hx      Review of patient's allergies indicates:   Allergen Reactions    Sulfa (sulfonamide antibiotics) Nausea Only       Current Outpatient Medications:     felodipine (PLENDIL) 10 MG 24 hr tablet, TAKE 1 TABLET EVERY DAY, Disp: 90 tablet, Rfl: 3    hydroCHLOROthiazide (MICROZIDE) 12.5 mg capsule, TAKE 1 CAPSULE EVERY DAY, Disp: 90 capsule, Rfl: 3    lamotrigine (LAMICTAL) 200 MG tablet, Take 1 tablet (200 mg total) by mouth once daily., Disp: 90 tablet, Rfl: 3    latanoprost 0.005 % ophthalmic solution, INSTILL 1 DROP INTO BOTH EYES EVERY EVENING, Disp: 1 Bottle, Rfl: 1    lisinopril (PRINIVIL,ZESTRIL) 20 MG tablet, TAKE 2 TABLETS ONE TIME DAILY, Disp: 180 tablet, Rfl: 3    melatonin 5 mg Cap, Take 5 mg by mouth every evening. , Disp: , Rfl:     multivitamin (ONE DAILY MULTIVITAMIN) per tablet, Take 1 tablet by mouth once daily., Disp: , Rfl:     omeprazole (PRILOSEC) 20 MG capsule, TAKE 1 CAPSULE ONE TIME DAILY, Disp: 90 capsule, Rfl: 3    polyethylene glycol (GLYCOLAX) 17 gram/dose powder, Take 17 g by mouth daily as needed., Disp: 1530 g, Rfl: 3    promethazine (PHENERGAN) 25 MG tablet, TAKE 1 TABLET (25 MG TOTAL) BY MOUTH EVERY 6 (SIX) HOURS AS NEEDED FOR NAUSEA., Disp: 30 tablet, Rfl: 1    RESTASIS 0.05 % ophthalmic emulsion, INSTILL 1 DROP INTO BOTH EYES TWICE DAILY, Disp: 180 vial, Rfl: 2    venlafaxine (EFFEXOR-XR) 150 MG Cp24, Take 1 capsule (150 mg total) by mouth every morning., Disp: 90 capsule, Rfl: 0    Vitals:    02/20/19 1250   BP: 132/78   Pulse: 80   Temp: 98.5 °F (36.9 °C)   TempSrc: Oral   Weight: 77.3 kg (170 lb 6.7 oz)   Height: 5' 2.5" (1.588 m)   PainSc:   7   PainLoc: Head       Physical Exam   Constitutional: She is oriented to person, place, and time. Vital signs are normal. She appears well-developed and well-nourished.   HENT:   Head: Normocephalic and atraumatic.   Right Ear: Hearing normal.   Left Ear: Hearing normal.   Nose: Nose normal. "   Mouth/Throat: Uvula is midline.   Eyes: Conjunctivae, EOM and lids are normal. Pupils are equal, round, and reactive to light.   Neck: Trachea normal, normal range of motion, full passive range of motion without pain and phonation normal. Neck supple.   Cardiovascular: Normal rate, regular rhythm, intact distal pulses and normal pulses.   Pulmonary/Chest: Effort normal and breath sounds normal.   Abdominal: Soft. Normal appearance.   Musculoskeletal: Normal range of motion.   Feet:   Right Foot:   Protective Sensation: 4 sites tested. 4 sites sensed.   Skin Integrity: Negative for ulcer.   Left Foot:   Protective Sensation: 4 sites tested. 4 sites sensed.   Skin Integrity: Negative for ulcer.   Neurological: She is alert and oriented to person, place, and time. She has normal strength and normal reflexes. No cranial nerve deficit or sensory deficit. She has a normal Finger-Nose-Finger Test, a normal Heel to Shin Test, a normal Romberg Test and a normal Tandem Gait Test. She displays a negative Romberg sign. Gait normal.   Reflex Scores:       Tricep reflexes are 2+ on the right side and 2+ on the left side.       Bicep reflexes are 2+ on the right side and 2+ on the left side.       Brachioradialis reflexes are 2+ on the right side and 2+ on the left side.       Patellar reflexes are 2+ on the right side and 2+ on the left side.       Achilles reflexes are 2+ on the right side and 2+ on the left side.  Skin: Skin is warm, dry and intact. Capillary refill takes less than 2 seconds.   Psychiatric: She has a normal mood and affect. Her speech is normal and behavior is normal. Judgment and thought content normal. Cognition and memory are normal.   Nursing note and vitals reviewed.      Neurologic Exam     Mental Status   Oriented to person, place, and time.   Follows 3 step commands.   Attention: normal. Concentration: normal.   Speech: speech is normal   Level of consciousness: alert  Knowledge: good.   Able to name  object.     Cranial Nerves     CN II   Visual fields full to confrontation.   Visual acuity: normal  Right visual field deficit: none  Left visual field deficit: none     CN III, IV, VI   Pupils are equal, round, and reactive to light.  Extraocular motions are normal.   CN III: no CN III palsy  CN VI: no CN VI palsy    CN V   Facial sensation intact.   Right facial sensation deficit: none  Left facial sensation deficit: none    CN VII   Facial expression full, symmetric.   Right facial weakness: none  Left facial weakness: none    CN VIII   CN VIII normal.   Hearing: intact    CN IX, X   CN IX normal.   CN X normal.     CN XI   CN XI normal.     CN XII   CN XII normal.     Motor Exam   Muscle bulk: normal  Overall muscle tone: normal  Right arm tone: normal  Left arm tone: normal  Right arm pronator drift: absent  Left arm pronator drift: absent  Right leg tone: normal  Left leg tone: normal    Strength   Strength 5/5 throughout.   Right neck flexion: 5/5  Left neck flexion: 5/5  Right neck extension: 5/5  Left neck extension: 5/5  Right deltoid: 5/5  Left deltoid: 5/5  Right biceps: 5/5  Left biceps: 5/5  Right triceps: 5/5  Left triceps: 5/5  Right wrist flexion: 5/5  Left wrist flexion: 5/5  Right wrist extension: 5/5  Left wrist extension: 5/5  Right interossei: 5/5  Left interossei: 5/5  Right abdominals: 5/5  Left abdominals: 5/5  Right iliopsoas: 5/5  Left iliopsoas: 5/5  Right quadriceps: 5/5  Left quadriceps: 5/5  Right hamstrin/5  Left hamstrin/5  Right glutei: 5/5  Left glutei: 5/5  Right anterior tibial: 5/5  Left anterior tibial: 5/5  Right posterior tibial: 5/5  Left posterior tibial: 5/5  Right peroneal: 5/5  Left peroneal: 5/5  Right gastroc: 5/5  Left gastroc: 5/5    Sensory Exam   Light touch normal.   Right arm light touch: normal  Left arm light touch: normal  Right leg light touch: normal  Left leg light touch: normal  Vibration normal.     Gait, Coordination, and Reflexes      Gait  Gait: normal    Coordination   Romberg: negative  Finger to nose coordination: normal  Heel to shin coordination: normal  Tandem walking coordination: normal    Tremor   Resting tremor: absent  Intention tremor: absent  Action tremor: absent    Reflexes   Right brachioradialis: 2+  Left brachioradialis: 2+  Right biceps: 2+  Left biceps: 2+  Right triceps: 2+  Left triceps: 2+  Right patellar: 2+  Left patellar: 2+  Right achilles: 2+  Left achilles: 2+  Right : 2+  Left : 2+  Right plantar: normal  Left plantar: normal  Right Stearns: absent  Left Stearns: absent  Right ankle clonus: absent  Left ankle clonus: absent        Visit Diagnosis:   (ventriculoperitoneal) shunt status    Chronic intractable headache, unspecified headache type        Provider dictation:  PHQ:  2    The patient is a 63-year-old right-hand-dominant female presenting today for evaluation of headaches.  She has a history of a ruptured right A-comm aneurysm for which she underwent craniotomy in 1988 with Dr. Davila and that route.  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  She last saw Dr. Patel March 19, 2015.  At that time he recommended ICP bolt monitoring however canceled.  Her  shunt was initially placed from a right approach and then switched to the left.  Patient reports she has constant headache.  She reports she has a spinal cord stimulator for headaches but wants it removed.  She reports the pain is so bad she occasionally [place].  She denies any heart palpitations.  She reports lying down helps.  She feels that she is losing her hair with pain.  She has previously a patient of Dr. Heaton's in headache management.    On physical examination, her shunt is palpable nontender to palpation.  She is neurologically intact.    X-ray shunt series demonstrates a left frontal ventriculoperitoneal shunt.  There is a prior right craniotomy site with wires intact.  There is no  evidence of kinking.  There is continuity of tubing.    CT of the head done 10/08/2018 demonstrates a left frontal  shunt that terminates in the left lateral ventricle at the septum pellucidum.  There is prior gliosis in the right frontal lobe.  There is no acute hemorrhage.  There is also a remote infarction in the left occipital lobe I do not appreciate any obstruction.      At this time I had an extensive conversation with the patient regarding findings on imaging being stable.  I would like her to follow-up with Dr. Heaton for headaches.  Her imaging remains stable.  I will order a CTA given her prior aneurysm history for further evaluation.  If she has no improvement after follow up with Dr. Heaton, we can direct admit her for ICP bolt monitoring.                This note was done using voice recognition software. Please excuse any errors missed in proof reading.

## 2019-03-25 ENCOUNTER — TELEPHONE (OUTPATIENT)
Dept: NEUROLOGY | Facility: CLINIC | Age: 64
End: 2019-03-25

## 2019-03-25 NOTE — TELEPHONE ENCOUNTER
Called patients and rescheduled appointment due to provider being out. Appointment successfully rescheduled. Patient verbalized understanding.

## 2019-04-05 ENCOUNTER — TELEPHONE (OUTPATIENT)
Dept: NEUROSURGERY | Facility: CLINIC | Age: 64
End: 2019-04-05

## 2019-04-05 NOTE — TELEPHONE ENCOUNTER
----- Message from Kate Santana sent at 4/5/2019  7:33 AM CDT -----  Contact: self [phone]  Call placed to pod. Patient returned your call, please call back.  Thank you!

## 2019-05-06 RX ORDER — LISINOPRIL 20 MG/1
TABLET ORAL
Qty: 180 TABLET | Refills: 3 | Status: SHIPPED | OUTPATIENT
Start: 2019-05-06

## 2020-04-29 ENCOUNTER — TELEPHONE (OUTPATIENT)
Dept: NEUROSURGERY | Facility: CLINIC | Age: 65
End: 2020-04-29

## 2023-04-25 NOTE — TELEPHONE ENCOUNTER
"Please let the patient know that I have reviewed her cervical spine MRI and it shows that she actually has fairly significant spinal canal narrowing at C3/4 and C4/5 with her disks contacting the spinal cord.  I think that this may need in part causing some of her headaches and definitely her neck and upper back pain.  Please inquire to whether she has had any epidural steroid injections, I know she had the "rhizotomy "procedures without relief but did she have any simple epidural steroid injections?  If not, I would try this to see if it provides some relief of her neck pain and headaches prior to considering spinal cord stimulation and we also may need her to see Dr. Patel regarding this MRI as well.  "
Referral placed.  
Spoke with the patient regarding the recommendations. BRANT scheduled for 8/1. Patient will contact Dr. Patel's office to schedule an appointment. Patient will need a referral. She will follow up after her visit with Dr. Patel.  
No